# Patient Record
Sex: MALE | Race: WHITE | HISPANIC OR LATINO | Employment: FULL TIME | URBAN - METROPOLITAN AREA
[De-identification: names, ages, dates, MRNs, and addresses within clinical notes are randomized per-mention and may not be internally consistent; named-entity substitution may affect disease eponyms.]

---

## 2018-10-02 ENCOUNTER — NON-PROVIDER VISIT (OUTPATIENT)
Dept: URGENT CARE | Facility: CLINIC | Age: 43
End: 2018-10-02

## 2018-10-02 DIAGNOSIS — Z02.1 PRE-EMPLOYMENT DRUG SCREENING: ICD-10-CM

## 2018-10-02 LAB
AMP AMPHETAMINE: NEGATIVE
COC COCAINE: NEGATIVE
INT CON NEG: NORMAL
INT CON POS: NORMAL
MET METHAMPHETAMINES: NEGATIVE
OPI OPIATES: NEGATIVE
PCP PHENCYCLIDINE: NEGATIVE
POC DRUG COMMENT 753798-OCCUPATIONAL HEALTH: NORMAL
THC: NEGATIVE

## 2018-10-02 PROCEDURE — 80305 DRUG TEST PRSMV DIR OPT OBS: CPT | Performed by: PHYSICIAN ASSISTANT

## 2020-02-26 ENCOUNTER — TELEPHONE (OUTPATIENT)
Dept: SCHEDULING | Facility: IMAGING CENTER | Age: 45
End: 2020-02-26

## 2020-03-26 ENCOUNTER — OFFICE VISIT (OUTPATIENT)
Dept: MEDICAL GROUP | Facility: PHYSICIAN GROUP | Age: 45
End: 2020-03-26
Payer: COMMERCIAL

## 2020-03-26 VITALS
HEIGHT: 64 IN | TEMPERATURE: 98.1 F | WEIGHT: 291.4 LBS | OXYGEN SATURATION: 80 % | DIASTOLIC BLOOD PRESSURE: 88 MMHG | HEART RATE: 104 BPM | BODY MASS INDEX: 49.75 KG/M2 | SYSTOLIC BLOOD PRESSURE: 118 MMHG | RESPIRATION RATE: 20 BRPM

## 2020-03-26 DIAGNOSIS — G47.30 SLEEP APNEA, UNSPECIFIED TYPE: ICD-10-CM

## 2020-03-26 DIAGNOSIS — R73.03 PREDIABETES: ICD-10-CM

## 2020-03-26 DIAGNOSIS — Z11.3 SCREENING FOR STD (SEXUALLY TRANSMITTED DISEASE): ICD-10-CM

## 2020-03-26 DIAGNOSIS — Z30.09 VASECTOMY EVALUATION: ICD-10-CM

## 2020-03-26 DIAGNOSIS — Z13.29 SCREENING FOR THYROID DISORDER: ICD-10-CM

## 2020-03-26 DIAGNOSIS — Z23 NEED FOR VACCINATION: ICD-10-CM

## 2020-03-26 DIAGNOSIS — R09.02 HYPOXIA: ICD-10-CM

## 2020-03-26 DIAGNOSIS — E66.01 MORBID OBESITY WITH BMI OF 50.0-59.9, ADULT (HCC): ICD-10-CM

## 2020-03-26 PROCEDURE — 90686 IIV4 VACC NO PRSV 0.5 ML IM: CPT | Performed by: PHYSICIAN ASSISTANT

## 2020-03-26 PROCEDURE — 90472 IMMUNIZATION ADMIN EACH ADD: CPT | Performed by: PHYSICIAN ASSISTANT

## 2020-03-26 PROCEDURE — 90471 IMMUNIZATION ADMIN: CPT | Performed by: PHYSICIAN ASSISTANT

## 2020-03-26 PROCEDURE — 99204 OFFICE O/P NEW MOD 45 MIN: CPT | Mod: 25 | Performed by: PHYSICIAN ASSISTANT

## 2020-03-26 PROCEDURE — 90715 TDAP VACCINE 7 YRS/> IM: CPT | Performed by: PHYSICIAN ASSISTANT

## 2020-03-26 RX ORDER — IBUPROFEN 200 MG
200 TABLET ORAL EVERY 6 HOURS PRN
COMMUNITY
End: 2020-04-23

## 2020-03-26 ASSESSMENT — PATIENT HEALTH QUESTIONNAIRE - PHQ9: CLINICAL INTERPRETATION OF PHQ2 SCORE: 0

## 2020-03-26 NOTE — PROGRESS NOTES
Chief Complaint   Patient presents with   • Establish Care       HISTORY OF THE PRESENT ILLNESS: Coco Mcdonnell is a 45 y.o. male new patient to our practice. This pleasant patient is here today to establish care and to discuss the evaluation and management of:    Hypoxia  Sleep apnea, unspecified type  During today's appointment patient's pulse ox on room air was fluctuating between from 80-88.  After patient had walked for 2 minutes pulse ox dropped to 80 on room air.    He denied feeling shortness of breath or dizzy.  States he feels well.  He does mention that he was diagnosed with sleep apnea 2+ years ago while living in [facility].  States he was prescribed a CPAP machine and used it compliantly up until 1 year ago.  States 1 year ago he relocated to [place] and had to return CPAP machine.  States he is not been using a CPAP machine for a year.  He admits to witnessed apneic episodes and states he snores.  Admits to daytime somnolence.  Patient's BMI is 50.02.    Prediabetes  Patient states he is a positive past medical history for prediabetes.  [facility] lab work has not been completed in several years.  He tells me his diet could improve and he does not have a regular exercise routine.  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  He denies polyuria, polydipsia, poor wound healing, vision changes, peripheral neuropathy.    Morbid obesity with BMI of 50.0-59.9, adult (HCC)  See above.    Screening for STD (sexually transmitted disease)  Patient is requesting to be screened for sexually transmitted infections.  States he is asymptomatic.  Denies fever, chills, nausea, vomiting, joint pain, headache, unintentional weight loss, myalgias, night sweats, lymphadenopathy, abnormal urethral discharge, dysuria, hematuria, urgency or frequency.  Denies genitalia blister/warts/lesions.    Vasectomy evaluation  Patient is requesting to be referred to urologist to discuss  vasectomy.'s states he and his wife have discussed this in great detail and have agreed for him to proceed with a vasectomy.  Referral has been placed.      No past medical history on file.    Past Surgical History:   Procedure Laterality Date   • APPENDECTOMY         Family Status   Relation Name Status   • Mo  Alive   • Fa     • Bro  Alive   • MGMo     • MGFa     • PGMo     • PGFa     • Son Twin Alive   • Son Twin Alive   • Son  Alive     Family History   Problem Relation Age of Onset   • No Known Problems Mother    • Heart Attack Father 52        Passed away at 51 y/o   • Hypertension Father    • Hyperlipidemia Father    • Obesity Father    • No Known Problems Brother    • No Known Problems Son    • No Known Problems Son    • No Known Problems Son        Social History     Tobacco Use   • Smoking status: Light Tobacco Smoker     Years: 10.00     Types: Cigarettes   • Smokeless tobacco: Never Used   • Tobacco comment: Smokes once a month when with friends and loved ones.   Substance Use Topics   • Alcohol use: Yes     Comment: Socially.    • Drug use: Yes     Types: Marijuana     Comment: 2 times per week.        Allergies: Patient has no known allergies.    Current Outpatient Medications Ordered in Epic   Medication Sig Dispense Refill   • ibuprofen (MOTRIN) 200 MG Tab Take 200 mg by mouth every 6 hours as needed.       No current Epic-ordered facility-administered medications on file.        Review of Systems   Constitutional: Negative for fever, chills, weight loss and malaise/fatigue.   HENT: Negative for ear pain, nosebleeds, congestion, sore throat and neck pain.    Eyes: Negative for blurred vision.   Respiratory: Negative for cough, sputum production, shortness of breath and wheezing.    Cardiovascular: Negative for chest pain, palpitations, orthopnea and leg swelling.   Gastrointestinal: Negative for heartburn, nausea, vomiting and abdominal pain.  "  Genitourinary: Negative for dysuria, urgency and frequency.   Musculoskeletal: Negative for myalgias, back pain and joint pain.   Skin: Negative for rash and itching.   Neurological: Negative for dizziness, tingling, tremors, sensory change, focal weakness and headaches.   Endo/Heme/Allergies: Does not bruise/bleed easily.   Psychiatric/Behavioral: Negative for depression, anxiety, or memory loss.     All other systems reviewed and are negative except as in HPI.    Exam: /88 (BP Location: Left arm, Patient Position: Sitting)   Pulse (!) 104   Temp 36.7 °C (98.1 °F) (Temporal)   Resp 20   Ht 1.626 m (5' 4\")   Wt (!) 132.2 kg (291 lb 6.4 oz)   SpO2 (!) 80%  Body mass index is 50.02 kg/m².  General: Normal appearing. No distress.  HEENT: Normocephalic. Eyes conjunctiva clear lids without ptosis, ears normal shape and contour.  Neck: Supple without JVD. Thyroid is not enlarged.  Pulmonary: Clear to ausculation.  Normal effort. No rales, ronchi, or wheezing.  Cardiovascular: Regular rate and rhythm without murmur.   Abdomen:  Positive for central obesity.  Neurologic: Grossly nonfocal  Skin: Warm and dry.  No obvious lesions. + for acanthosis nigricans of posterior neck.  Musculoskeletal: Normal gait. No extremity cyanosis, clubbing, or edema.  Psych: Normal mood and affect. Alert and oriented x3. Judgment and insight is normal.    Medical decision-making and discussion:  1. Hypoxia  2. Sleep apnea, unspecified type    During today's appointment patient's pulse ox on room air was fluctuating between from 80-88.  After patient had walked for 2 minutes pulse ox dropped to 80 on room air.    He denied feeling shortness of breath or dizzy.  States he feels well.  He does mention that he was diagnosed with sleep apnea 2+ years ago while living in [facility].  States he was prescribed a CPAP machine and used it compliantly up until 1 year ago.  States 1 year ago he relocated to [place] and had to return CPAP " machine.  States he is not been using a CPAP machine for a year.  He admits to witnessed apneic episodes and states he snores.  Admits to daytime somnolence.  Patient's BMI is 50.02.    Patient has been urgently referred to pulmonology and sleep studies.  Pulse ox has also been ordered and 24 7 supplemental oxygen 2 L/min has been ordered as well.  Patient will be contacted with instructions.    Highly emphasized importance of normalizing body weight.  Patient has been referred to medical weight management as well.    - REFERRAL TO PULMONOLOGY  - REFERRAL TO SLEEP STUDIES  - DME Pulse Oximetry    3. Prediabetes  Highly emphasized importance of healthy diet regular exercise routine with patient.  Patient has been approved to medical weight management.  Patient be contacted with lab work results.  Patient will follow-up in 1 month to discuss lab work results.    - Comp Metabolic Panel; Future  - CBC WITH DIFFERENTIAL; Future  - Lipid Profile; Future  - HEMOGLOBIN A1C; Future  - MICROALB/CREAT RATIO RAND. UR  - REFERRAL TO [facility] (HIP) Services Requested: Physician Medical Weight Management Program, Registered Dietitian for Medical Nutrition Therapy, General-HIP Staff to Evaluate Best Program; Reason for Referral? Waist Circum...    4. Morbid obesity with BMI of 50.0-59.9, adult (HCC)  - Encouraged diet high in fruits, vegetables, and fiber. And a diet low in salt, refined carbohydrates, cholesterol, saturated fat, and trans fatty acids.    - Encouraged  a minimum of 30 minutes of moderate intensity aerobic exercise (eg, brisk walking) is recommended on five days each week. Or 20 minutes of vigorous-intensity aerobic exercise (eg, jogging) on three days each week.     Patient has been furred to medical weight management.  Patient agreed to plan.  Lab work has been ordered.  Patient be contacted with lab work results.    - Patient identified as having weight management issue.  Appropriate  orders and counseling given.  - Comp Metabolic Panel; Future  - Lipid Profile; Future  - TSH WITH REFLEX TO FT4; Future  - HEMOGLOBIN A1C; Future  - REFERRAL TO [facility] (HIP) Services Requested: Physician Medical Weight Management Program, Registered Dietitian for Medical Nutrition Therapy, General-[facility] Staff to Evaluate Best Program; Reason for Referral? Waist Circum...    5. Screening for thyroid disorder    - TSH WITH REFLEX TO FT4; Future    6. Screening for STD (sexually transmitted disease)  Lab work has been ordered to further evaluate patient.  Patient be contacted with results.  Depending on results a follow-up appointment may be warranted.    - Chlamydia/GC PCR Urine Or Swab; Future  - HIV AG/AB COMBO ASSAY SCREENING; Future  - RPR (SYPHILIS); Future  - HEP B SURFACE ANTIGEN; Future  - HEP C VIRUS ANTIBODY; Future    7. Vasectomy evaluation  Patient is requesting to be referred to urologist to discuss vasectomy.'s states he and his wife have discussed this in great detail and have agreed for him to proceed with a vasectomy.  Referral has been placed.    - REFERRAL TO UROLOGY    8. Need for vaccination  Vaccinations were administered to patient without complications.  Patient was provided VIS forms.    - Tdap Vaccine =>6YO IM  - Influenza Vaccine Quad Injection (PF)      Please note that this dictation was created using voice recognition software. I have made every reasonable attempt to correct obvious errors, but I expect that there are errors of grammar and possibly content that I did not discover before finalizing the note.      Assessment/Plan  1. Hypoxia  REFERRAL TO PULMONOLOGY    REFERRAL TO SLEEP STUDIES   2. Sleep apnea, unspecified type  REFERRAL TO SLEEP STUDIES    CANCELED: REFERRAL TO SLEEP STUDIES   3. Prediabetes  Comp Metabolic Panel    CBC WITH DIFFERENTIAL    Lipid Profile    HEMOGLOBIN A1C    MICROALB/CREAT RATIO RAND. UR    REFERRAL TO [facility]  PROGRAMS (HIP) Services Requested: Physician Medical Weight Management Program, Registered Dietitian for Medical Nutrition Therapy[facility]; Reason for Referral? Waist Circum...   4. Morbid obesity with BMI of 50.0-59.9, adult (HCC)  Patient identified as having weight management issue.  Appropriate orders and counseling given.    Comp Metabolic Panel    Lipid Profile    TSH WITH REFLEX TO FT4    HEMOGLOBIN A1C    REFERRAL TO [facility] (HIP) Services Requested: Physician Medical Weight Management Program, Registered Dietitian for Medical Nutrition Therapy, [facility] to [facility] Best [facility]; Reason for Referral? Waist Circum...   5. Screening for thyroid disorder  TSH WITH REFLEX TO FT4   6. Screening for STD (sexually transmitted disease)  Chlamydia/GC PCR Urine Or Swab    HIV AG/AB COMBO ASSAY SCREENING    RPR (SYPHILIS)    HEP B SURFACE ANTIGEN    HEP C VIRUS ANTIBODY   7. Vasectomy evaluation  REFERRAL TO UROLOGY   8. Need for vaccination  Tdap Vaccine =>6YO IM    Influenza Vaccine Quad Injection (PF)       Return in about 1 month (around 4/26/2020).

## 2020-03-27 ENCOUNTER — TELEPHONE (OUTPATIENT)
Dept: MEDICAL GROUP | Facility: PHYSICIAN GROUP | Age: 45
End: 2020-03-27

## 2020-03-27 NOTE — TELEPHONE ENCOUNTER
Phone Number Called: [phone] (home)     Call outcome: Spoke to patient regarding message below.    Message: Pt informed,voiced understanding. Orders sent to [facility].  ----- Message from Dominga Villaseñor P.A.-C. sent at 3/26/2020 10:40 AM PDT -----  Demetrius Horn,    Please place portable oxygen concentrator 2 LPM.    Please call patient and advise him that he needs to use the portable oxygen 24/7.  Advised him when he is doing the pulse oximetry overnight test to not use the portable oxygen at that time because the need to see what his oxygen levels are at night without using the supplemental oxygen.  Advised patient have urgent referred him to sleep studies and pulmonology for further evaluation.      Thank you,    Cherelle GRANT

## 2020-03-28 ENCOUNTER — HOSPITAL ENCOUNTER (OUTPATIENT)
Dept: LAB | Facility: MEDICAL CENTER | Age: 45
End: 2020-03-28
Attending: PHYSICIAN ASSISTANT
Payer: COMMERCIAL

## 2020-03-28 DIAGNOSIS — R73.03 PREDIABETES: ICD-10-CM

## 2020-03-28 DIAGNOSIS — E66.01 MORBID OBESITY WITH BMI OF 50.0-59.9, ADULT (HCC): ICD-10-CM

## 2020-03-28 DIAGNOSIS — Z11.3 SCREENING FOR STD (SEXUALLY TRANSMITTED DISEASE): ICD-10-CM

## 2020-03-28 DIAGNOSIS — Z13.29 SCREENING FOR THYROID DISORDER: ICD-10-CM

## 2020-03-28 LAB
ALBUMIN SERPL BCP-MCNC: 4.2 G/DL (ref 3.2–4.9)
ALBUMIN/GLOB SERPL: 1.4 G/DL
ALP SERPL-CCNC: 48 U/L (ref 30–99)
ALT SERPL-CCNC: 23 U/L (ref 2–50)
ANION GAP SERPL CALC-SCNC: 9 MMOL/L (ref 7–16)
AST SERPL-CCNC: 14 U/L (ref 12–45)
BASOPHILS # BLD AUTO: 0.5 % (ref 0–1.8)
BASOPHILS # BLD: 0.04 K/UL (ref 0–0.12)
BILIRUB SERPL-MCNC: 0.6 MG/DL (ref 0.1–1.5)
BUN SERPL-MCNC: 10 MG/DL (ref 8–22)
C TRACH DNA SPEC QL NAA+PROBE: NEGATIVE
CALCIUM SERPL-MCNC: 9.2 MG/DL (ref 8.5–10.5)
CHLORIDE SERPL-SCNC: 104 MMOL/L (ref 96–112)
CHOLEST SERPL-MCNC: 162 MG/DL (ref 100–199)
CO2 SERPL-SCNC: 31 MMOL/L (ref 20–33)
CREAT SERPL-MCNC: 0.86 MG/DL (ref 0.5–1.4)
CREAT UR-MCNC: 102.76 MG/DL
EOSINOPHIL # BLD AUTO: 0.14 K/UL (ref 0–0.51)
EOSINOPHIL NFR BLD: 1.9 % (ref 0–6.9)
ERYTHROCYTE [DISTWIDTH] IN BLOOD BY AUTOMATED COUNT: 60.2 FL (ref 35.9–50)
EST. AVERAGE GLUCOSE BLD GHB EST-MCNC: 148 MG/DL
GLOBULIN SER CALC-MCNC: 2.9 G/DL (ref 1.9–3.5)
GLUCOSE SERPL-MCNC: 98 MG/DL (ref 65–99)
HBA1C MFR BLD: 6.8 % (ref 0–5.6)
HBV SURFACE AG SER QL: NORMAL
HCT VFR BLD AUTO: 64 % (ref 42–52)
HCV AB SER QL: NORMAL
HDLC SERPL-MCNC: 40 MG/DL
HGB BLD-MCNC: 20.3 G/DL (ref 14–18)
HIV 1+2 AB+HIV1 P24 AG SERPL QL IA: NORMAL
IMM GRANULOCYTES # BLD AUTO: 0.03 K/UL (ref 0–0.11)
IMM GRANULOCYTES NFR BLD AUTO: 0.4 % (ref 0–0.9)
LDLC SERPL CALC-MCNC: 107 MG/DL
LYMPHOCYTES # BLD AUTO: 1.84 K/UL (ref 1–4.8)
LYMPHOCYTES NFR BLD: 24.8 % (ref 22–41)
MCH RBC QN AUTO: 31.8 PG (ref 27–33)
MCHC RBC AUTO-ENTMCNC: 31.7 G/DL (ref 33.7–35.3)
MCV RBC AUTO: 100.2 FL (ref 81.4–97.8)
MICROALBUMIN UR-MCNC: 12 MG/DL
MICROALBUMIN/CREAT UR: 117 MG/G (ref 0–30)
MONOCYTES # BLD AUTO: 0.76 K/UL (ref 0–0.85)
MONOCYTES NFR BLD AUTO: 10.3 % (ref 0–13.4)
N GONORRHOEA DNA SPEC QL NAA+PROBE: NEGATIVE
NEUTROPHILS # BLD AUTO: 4.6 K/UL (ref 1.82–7.42)
NEUTROPHILS NFR BLD: 62.1 % (ref 44–72)
NRBC # BLD AUTO: 0 K/UL
NRBC BLD-RTO: 0 /100 WBC
PLATELET # BLD AUTO: 198 K/UL (ref 164–446)
PMV BLD AUTO: 10.5 FL (ref 9–12.9)
POTASSIUM SERPL-SCNC: 4.5 MMOL/L (ref 3.6–5.5)
PROT SERPL-MCNC: 7.1 G/DL (ref 6–8.2)
RBC # BLD AUTO: 6.39 M/UL (ref 4.7–6.1)
SODIUM SERPL-SCNC: 144 MMOL/L (ref 135–145)
SPECIMEN SOURCE: NORMAL
TREPONEMA PALLIDUM IGG+IGM AB [PRESENCE] IN SERUM OR PLASMA BY IMMUNOASSAY: NORMAL
TRIGL SERPL-MCNC: 76 MG/DL (ref 0–149)
TSH SERPL DL<=0.005 MIU/L-ACNC: 1.86 UIU/ML (ref 0.38–5.33)
WBC # BLD AUTO: 7.4 K/UL (ref 4.8–10.8)

## 2020-03-28 PROCEDURE — 82570 ASSAY OF URINE CREATININE: CPT

## 2020-03-28 PROCEDURE — 84443 ASSAY THYROID STIM HORMONE: CPT

## 2020-03-28 PROCEDURE — 87340 HEPATITIS B SURFACE AG IA: CPT

## 2020-03-28 PROCEDURE — 80061 LIPID PANEL: CPT

## 2020-03-28 PROCEDURE — 86780 TREPONEMA PALLIDUM: CPT

## 2020-03-28 PROCEDURE — 80053 COMPREHEN METABOLIC PANEL: CPT

## 2020-03-28 PROCEDURE — 83036 HEMOGLOBIN GLYCOSYLATED A1C: CPT

## 2020-03-28 PROCEDURE — 87491 CHLMYD TRACH DNA AMP PROBE: CPT

## 2020-03-28 PROCEDURE — 85025 COMPLETE CBC W/AUTO DIFF WBC: CPT

## 2020-03-28 PROCEDURE — 87389 HIV-1 AG W/HIV-1&-2 AB AG IA: CPT

## 2020-03-28 PROCEDURE — 36415 COLL VENOUS BLD VENIPUNCTURE: CPT

## 2020-03-28 PROCEDURE — 87591 N.GONORRHOEAE DNA AMP PROB: CPT

## 2020-03-28 PROCEDURE — 86803 HEPATITIS C AB TEST: CPT

## 2020-03-28 PROCEDURE — 82043 UR ALBUMIN QUANTITATIVE: CPT

## 2020-03-31 ENCOUNTER — TELEPHONE (OUTPATIENT)
Dept: MEDICAL GROUP | Facility: PHYSICIAN GROUP | Age: 45
End: 2020-03-31

## 2020-03-31 DIAGNOSIS — D64.9 ANEMIA, UNSPECIFIED TYPE: ICD-10-CM

## 2020-03-31 NOTE — TELEPHONE ENCOUNTER
Phone Number Called: [phone] (home)       Call outcome: Spoke to patient regarding message below.    Message: Pt informed, Pt understood, no questions at this time.

## 2020-03-31 NOTE — TELEPHONE ENCOUNTER
----- Message from Dominga Villaseñor P.A.-C. sent at 3/31/2020  9:09 AM PDT -----  Demetrius Sepulveda,    I hope this finds you well.  I have reviewed your lab work results and results indicate that you have diabetes with some kidney damage due to diabetes.  We will discuss in more detail during your follow-up appointment.  Bad cholesterol is slightly elevated.  We will also discuss this during your follow-up appointment.  Please continue working on diet and exercise.  Lab work indicates possible anemia.  Additional lab work has been ordered for you to complete.  You do not need to fast to complete lab work.  Lab work indicates your negative for hep C, HIV, hep B, syphilis, chlamydia and gonorrhea.    Thank you,    Cherelle GRANT

## 2020-04-08 ENCOUNTER — HOSPITAL ENCOUNTER (OUTPATIENT)
Dept: LAB | Facility: MEDICAL CENTER | Age: 45
End: 2020-04-08
Attending: PHYSICIAN ASSISTANT
Payer: COMMERCIAL

## 2020-04-08 DIAGNOSIS — D64.9 ANEMIA, UNSPECIFIED TYPE: ICD-10-CM

## 2020-04-08 LAB
FERRITIN SERPL-MCNC: 131 NG/ML (ref 22–322)
FOLATE SERPL-MCNC: 19.8 NG/ML
IRON SATN MFR SERPL: 28 % (ref 15–55)
IRON SERPL-MCNC: 112 UG/DL (ref 50–180)
TIBC SERPL-MCNC: 402 UG/DL (ref 250–450)
UIBC SERPL-MCNC: 290 UG/DL (ref 110–370)
VIT B12 SERPL-MCNC: 406 PG/ML (ref 211–911)

## 2020-04-08 PROCEDURE — 82746 ASSAY OF FOLIC ACID SERUM: CPT

## 2020-04-08 PROCEDURE — 83540 ASSAY OF IRON: CPT

## 2020-04-08 PROCEDURE — 82728 ASSAY OF FERRITIN: CPT

## 2020-04-08 PROCEDURE — 83550 IRON BINDING TEST: CPT

## 2020-04-08 PROCEDURE — 36415 COLL VENOUS BLD VENIPUNCTURE: CPT

## 2020-04-08 PROCEDURE — 82607 VITAMIN B-12: CPT

## 2020-04-13 ASSESSMENT — ENCOUNTER SYMPTOMS
HEMOPTYSIS: 1
WHEEZING: 1
CHEST TIGHTNESS: 1
DYSPNEA AT REST: 0

## 2020-04-14 ENCOUNTER — OFFICE VISIT (OUTPATIENT)
Dept: PULMONOLOGY | Facility: HOSPICE | Age: 45
End: 2020-04-14
Payer: COMMERCIAL

## 2020-04-14 VITALS
SYSTOLIC BLOOD PRESSURE: 136 MMHG | HEART RATE: 88 BPM | WEIGHT: 295.38 LBS | OXYGEN SATURATION: 94 % | BODY MASS INDEX: 50.43 KG/M2 | HEIGHT: 64 IN | DIASTOLIC BLOOD PRESSURE: 96 MMHG

## 2020-04-14 DIAGNOSIS — G47.33 OSA (OBSTRUCTIVE SLEEP APNEA): ICD-10-CM

## 2020-04-14 DIAGNOSIS — D75.1 POLYCYTHEMIA: Primary | ICD-10-CM

## 2020-04-14 DIAGNOSIS — J96.11 CHRONIC RESPIRATORY FAILURE WITH HYPOXIA (HCC): ICD-10-CM

## 2020-04-14 PROCEDURE — 99214 OFFICE O/P EST MOD 30 MIN: CPT | Performed by: INTERNAL MEDICINE

## 2020-04-14 ASSESSMENT — ENCOUNTER SYMPTOMS
WHEEZING: 1
GASTROINTESTINAL NEGATIVE: 1
PSYCHIATRIC NEGATIVE: 1
NEUROLOGICAL NEGATIVE: 1
CONSTITUTIONAL NEGATIVE: 1
HEMOPTYSIS: 1
MUSCULOSKELETAL NEGATIVE: 1
EYES NEGATIVE: 1
CARDIOVASCULAR NEGATIVE: 1

## 2020-04-14 ASSESSMENT — FIBROSIS 4 INDEX: FIB4 SCORE: 0.66

## 2020-04-14 NOTE — ASSESSMENT & PLAN NOTE
_x___ Discussed the pathophysiology of sleep disordered breathing including risks for hypertension, heart attack, and stroke   Also discussed treatment options including CPAP, an oral appliance, and surgical intervention.   __x___ Discussed weight control program with goal of achieving and maintaining ideal body weight.   __x___ Polysomnography was discussed and ordered   Hst on 4 l/min   Not ideal and would have preferred inlab but due to pandemic labs are closed and pt does have dangerously elevated hct  __x___Discussed weight control programs with goal of achieving and maintaining ideal body weight   __x___Discussed the diagnosis , management and pathophysiology JASON   _x___Discussed the risks associated with driving and operating equipment while drowsy. The patient verbalized an understanding of this and agreed to take appropriate measures to eliminate these risks.   __x___Discussed the cardiovascular and neuropsychiatric risks of untreated JASON; including but not limited to: HTN, DM, MI, ASCVD, CVA, CHF, traffic accidents   __x___ Discussed positive airway pressure (PAP) as the preferred therapy for severe JASON;   _____ Discussed risks of anesthesia/analgesia associated with JASON and the need to use PAP in the pre, liz, and post -operative periods.

## 2020-04-14 NOTE — ASSESSMENT & PLAN NOTE
Due to hypoxemia likely due to untreated JASON and obesity hypoventilation syndrome  HCT 64  High risks of MI and stroke  Discussed with DR. Lyles from hematology - agrees with outpatient phlebotomy  Scheduled for three days and to stop if hct less than or = to 55  CBC check post each day  Will get sleep study at that time  Reviewed with patient in detail

## 2020-04-14 NOTE — PROGRESS NOTES
Pulmonary Consultation    Date of Service: 4/14/2020    Consulting Physician: Dominga Villaseñor P.A.-C.    Reason for consult:  Establish Care (Referred by Dominga Villaseñor PA-C for Low O2 sat) and Other (Labs 03/28/2020)      Problem List Items Addressed This Visit     Polycythemia - Primary     Due to hypoxemia likely due to untreated JASON and obesity hypoventilation syndrome  HCT 64  High risks of MI and stroke  Discussed with DR. Lyles from hematology - agrees with outpatient phlebotomy  Scheduled for three days and to stop if hct less than or = to 55  CBC check post each day  Will get sleep study at that time  Reviewed with patient in detail           Relevant Orders    CBC WITHOUT DIFFERENTIAL    CBC WITHOUT DIFFERENTIAL    CBC WITHOUT DIFFERENTIAL    JASON (obstructive sleep apnea)       _x___ Discussed the pathophysiology of sleep disordered breathing including risks for hypertension, heart attack, and stroke   Also discussed treatment options including CPAP, an oral appliance, and surgical intervention.   __x___ Discussed weight control program with goal of achieving and maintaining ideal body weight.   __x___ Polysomnography was discussed and ordered   Hst on 4 l/min   Not ideal and would have preferred inlab but due to pandemic labs are closed and pt does have dangerously elevated hct  __x___Discussed weight control programs with goal of achieving and maintaining ideal body weight   __x___Discussed the diagnosis , management and pathophysiology JASON   _x___Discussed the risks associated with driving and operating equipment while drowsy. The patient verbalized an understanding of this and agreed to take appropriate measures to eliminate these risks.   __x___Discussed the cardiovascular and neuropsychiatric risks of untreated JASON; including but not limited to: HTN, DM, MI, ASCVD, CVA, CHF, traffic accidents   __x___ Discussed positive airway pressure (PAP) as the preferred therapy for severe JASON;   _____ Discussed risks  of anesthesia/analgesia associated with JASON and the need to use PAP in the pre, liz, and post -operative periods.         Relevant Orders    Overnight Home Sleep Study    Chronic respiratory failure with hypoxia (HCC)     Increased 4 l/min with ambulation and told pt to also increase night time to maintain sats > 88%             Follow up in one month    History of Present Illness: Coco Mcdonnell is a 45 y.o. male with a past medical history of hypoxemia and  SOB and Hct is 64!  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  SOL: immediate  denies racing thoughts/anxiety  use of hypnotic: no  WASO:  q 2 hrs  Ability to return to sleep:  yes  WT: 7 am  awakens refreshed: no  Excessive daytime somnolence:   Narcolepsy symptoms: no  RLS: no  Leg kicking:no  Parasomnia:no  sleep walking: no        Exercise tolerance:  Walking distance:SOB with stairs but flat ground ok  [place]: as above    Night time symptoms: as above    Pulmonary History:    Environmental exposures: no hot tub; no woodstove, no mining work, and no asbestos exposure     Pets:dog  Occupation History:warehous and production = [employer]  Family history of pulmonary disease: Grandmother - restrictive cesar disease  Second hand smoke exposure: no    Review of Systems   Constitutional: Negative.    HENT: Negative.    Eyes: Negative.    Respiratory: Positive for hemoptysis and wheezing.    Cardiovascular: Negative.    Gastrointestinal: Negative.    Genitourinary: Negative.    Musculoskeletal: Negative.    Skin: Negative.    Neurological: Negative.    Endo/Heme/Allergies: Negative.    Psychiatric/Behavioral: Negative.    Answers for HPI/ROS submitted by the patient on 4/13/2020   What  is the reason for your visit today?: check up  Do you cough first thing in the morning or at other times during the day?: morning  Do you have a cough on most days? If so, how long have you had this cough?: not on the day  Bring up phlegm (mucus, sputum) in the morning or other times during the day?: morning  If so, how many times, and approximately how much?: not often  How often?: frequently  Do you experience any chest tightness?: Yes  How often?: constant  Experience shortness of breath at rest?: No  How far can you walk before becoming short of breath or need to rest? : don't know  Please list what causes you to become short of breath:: walking up the stairs  Have you ever been hospitalized?: Yes  Reason, year, and hospital in which you were hospitalized:: appendicitis surgery as a child, [facility]  Have you ever needed to be intubated or placed on a ventilator? : No  Have you ever had problems with anesthesia?: No  Have you experienced post-operative delirium?: No  Any complications with surgery?: No  What year did you receive your last Flu shot?: 2020  What year did you receive you last Pneumonia shot?: no  Have you had a TB skin test? If so, please list the year and result:: i don't think i had  Have you had Allergy skin testing? If so, please list the year and result:: no  Please list all your occupations from your first job to your current job. Include Industry/Company, location, year, and your specific job.: [place] industry,[facility] 1997,present at [employer]  a bright Job: warehouse industry  a Mine or Quarry: no  a Foundry: no  with Pottery: no  Cot[employer] Mill: no  Flax Mill: no  Hemp Mill: no  Brick Plant: no  with Asbestos: no  as a Sandblaster: no  manufacturing of glass, ceramics, or abrasives: no  Acids: no  Arsenics: no  Cadmium: no  Chromium: no  Fibrogenic Dust: no  Lead: no  Nickel: no  Plastic: no  Solvents: no  TDI: no  Uranium: no  Please list the  "places you have lived, the year, and Country or State in the [place]:: [place] [id number]/[place] [id number]/[place] 2018to present  Birds?: No  Dogs?: Yes  Cats?: No  Mice and/or Deer Mice?: No  Reptiles?: No  Blood Chemistries: yes march 2020  Blood Count: yes march 2020  Cardiac Ultrasound: no  Chest X-ray: no  Pulmonary Function Test: no  CT Scan, Sinus: no  Electrocardiogram: no  Sleep Study: no  Coffee: 1 cup  Decaffeinated coffee: no  Energy drinks: no  Tea: no  Carbonated soft drinks: no         Current Outpatient Medications on File Prior to Visit   Medication Sig Dispense Refill   • ibuprofen (MOTRIN) 200 MG Tab Take 200 mg by mouth every 6 hours as needed.       No current facility-administered medications on file prior to visit.        Social History     Tobacco Use   • Smoking status: Light Tobacco Smoker     Years: 10.00     Types: Cigarettes   • Smokeless tobacco: Never Used   • Tobacco comment: Smokes once a month when with friends and loved ones.   Substance Use Topics   • Alcohol use: Not Currently     Comment: Socially.    • Drug use: Yes     Frequency: 4.0 times per week     Types: Marijuana     Comment: 2 times per week.         Past Medical History:   Diagnosis Date   • Chickenpox    • Diabetes (HCC)    • Kidney stone    • Obesity        Past Surgical History:   Procedure Laterality Date   • APPENDECTOMY  1981       Allergies: Patient has no known allergies.    Family History   Problem Relation Age of Onset   • No Known Problems Mother    • Heart Attack Father 52        Passed away at 53 y/o   • Hypertension Father    • Hyperlipidemia Father    • Obesity Father    • No Known Problems Brother    • No Known Problems Son    • No Known Problems Son    • No Known Problems Son        Vitals:    04/14/20 0821 04/14/20 0841   Height: 1.626 m (5' 4\")    Weight: (!) 134 kg (295 lb 6 oz)    Weight % change since last entry.: 0 %    BP: 136/96    Pulse: 88    BMI (Calculated): 50.7    O2 sat % room air:  " (!) 87 %   O2 sat % on O2:  94 %   O2 Flow Rate (L/min):  4       Physical Examination  Physical Exam   Constitutional: He is oriented to person, place, and time and well-developed, well-nourished, and in no distress. No distress.   HENT:   Head: Normocephalic and atraumatic.   Right Ear: External ear normal.   Left Ear: External ear normal.   Nose: Nose normal.   Mouth/Throat: Oropharynx is clear and moist. No oropharyngeal exudate.   Crowded airway   Eyes: Pupils are equal, round, and reactive to light. Conjunctivae and EOM are normal.   Neck: Normal range of motion. Neck supple. No JVD present. No tracheal deviation present. No thyromegaly present.   Cardiovascular: Normal rate, regular rhythm, normal heart sounds and intact distal pulses. Exam reveals no gallop and no friction rub.   No murmur heard.  Pulmonary/Chest: Effort normal and breath sounds normal. No stridor. No respiratory distress. He has no wheezes. He has no rales. He exhibits no tenderness.   Abdominal: Soft. Bowel sounds are normal. He exhibits no distension and no mass.   Musculoskeletal: Normal range of motion.         General: No deformity or edema.   Lymphadenopathy:     He has no cervical adenopathy.   Neurological: He is alert and oriented to person, place, and time. No cranial nerve deficit. He exhibits normal muscle tone. Gait normal. Coordination normal. GCS score is 15.   Skin: No rash noted. He is not diaphoretic.   Discoloration of lower extremities   Psychiatric: Mood, memory, affect and judgment normal.            Junior June M.D., MD MPH [facility]  [facility]

## 2020-04-15 ENCOUNTER — OUTPATIENT INFUSION SERVICES (OUTPATIENT)
Dept: ONCOLOGY | Facility: MEDICAL CENTER | Age: 45
End: 2020-04-15
Attending: INTERNAL MEDICINE
Payer: COMMERCIAL

## 2020-04-15 VITALS
WEIGHT: 296.3 LBS | RESPIRATION RATE: 18 BRPM | HEIGHT: 64 IN | SYSTOLIC BLOOD PRESSURE: 127 MMHG | HEART RATE: 89 BPM | BODY MASS INDEX: 50.59 KG/M2 | OXYGEN SATURATION: 91 % | DIASTOLIC BLOOD PRESSURE: 67 MMHG | TEMPERATURE: 98.4 F

## 2020-04-15 DIAGNOSIS — D75.1 POLYCYTHEMIA: ICD-10-CM

## 2020-04-15 LAB
BASOPHILS # BLD AUTO: 0.6 % (ref 0–1.8)
BASOPHILS # BLD: 0.04 K/UL (ref 0–0.12)
EOSINOPHIL # BLD AUTO: 0.18 K/UL (ref 0–0.51)
EOSINOPHIL NFR BLD: 2.5 % (ref 0–6.9)
ERYTHROCYTE [DISTWIDTH] IN BLOOD BY AUTOMATED COUNT: 54.2 FL (ref 35.9–50)
HCT VFR BLD AUTO: 60.6 % (ref 42–52)
HGB BLD-MCNC: 20.3 G/DL (ref 14–18)
IMM GRANULOCYTES # BLD AUTO: 0.01 K/UL (ref 0–0.11)
IMM GRANULOCYTES NFR BLD AUTO: 0.1 % (ref 0–0.9)
LYMPHOCYTES # BLD AUTO: 1.94 K/UL (ref 1–4.8)
LYMPHOCYTES NFR BLD: 27.4 % (ref 22–41)
MCH RBC QN AUTO: 32.4 PG (ref 27–33)
MCHC RBC AUTO-ENTMCNC: 33.5 G/DL (ref 33.7–35.3)
MCV RBC AUTO: 96.7 FL (ref 81.4–97.8)
MONOCYTES # BLD AUTO: 0.67 K/UL (ref 0–0.85)
MONOCYTES NFR BLD AUTO: 9.5 % (ref 0–13.4)
NEUTROPHILS # BLD AUTO: 4.24 K/UL (ref 1.82–7.42)
NEUTROPHILS NFR BLD: 59.9 % (ref 44–72)
NRBC # BLD AUTO: 0 K/UL
NRBC BLD-RTO: 0 /100 WBC
PLATELET # BLD AUTO: 197 K/UL (ref 164–446)
PMV BLD AUTO: 10.6 FL (ref 9–12.9)
RBC # BLD AUTO: 6.27 M/UL (ref 4.7–6.1)
WBC # BLD AUTO: 7.1 K/UL (ref 4.8–10.8)

## 2020-04-15 PROCEDURE — 99195 PHLEBOTOMY: CPT

## 2020-04-15 PROCEDURE — 36415 COLL VENOUS BLD VENIPUNCTURE: CPT

## 2020-04-15 PROCEDURE — 85025 COMPLETE CBC W/AUTO DIFF WBC: CPT

## 2020-04-15 ASSESSMENT — FIBROSIS 4 INDEX: FIB4 SCORE: 0.66

## 2020-04-15 NOTE — PROGRESS NOTES
Pt arrives for therapeutic phlebotomy.  Labs drawn as ordered by MD.  Pt's Hgb = 20.3   , Hct = 60.6  %.  500 cc of whole blood removed.  Pt tolerated well.  Orthostatic vitals stable, see flowsheet.  Pt denies chest pain, shortness of breath, dizziness, or lightheadedness after treatment.  Pt DC'd home to self care in good condition and in NAD. Appointment confirm for next treatment.

## 2020-04-16 ENCOUNTER — OUTPATIENT INFUSION SERVICES (OUTPATIENT)
Dept: ONCOLOGY | Facility: MEDICAL CENTER | Age: 45
End: 2020-04-16
Attending: INTERNAL MEDICINE
Payer: COMMERCIAL

## 2020-04-16 VITALS
BODY MASS INDEX: 52.3 KG/M2 | OXYGEN SATURATION: 91 % | WEIGHT: 295.2 LBS | RESPIRATION RATE: 18 BRPM | SYSTOLIC BLOOD PRESSURE: 126 MMHG | HEART RATE: 88 BPM | HEIGHT: 63 IN | DIASTOLIC BLOOD PRESSURE: 82 MMHG | TEMPERATURE: 98.1 F

## 2020-04-16 DIAGNOSIS — D75.1 POLYCYTHEMIA: ICD-10-CM

## 2020-04-16 LAB
BASOPHILS # BLD AUTO: 0.4 % (ref 0–1.8)
BASOPHILS # BLD: 0.03 K/UL (ref 0–0.12)
EOSINOPHIL # BLD AUTO: 0.24 K/UL (ref 0–0.51)
EOSINOPHIL NFR BLD: 3 % (ref 0–6.9)
ERYTHROCYTE [DISTWIDTH] IN BLOOD BY AUTOMATED COUNT: 53.6 FL (ref 35.9–50)
HCT VFR BLD AUTO: 57.7 % (ref 42–52)
HGB BLD-MCNC: 19.4 G/DL (ref 14–18)
IMM GRANULOCYTES # BLD AUTO: 0.02 K/UL (ref 0–0.11)
IMM GRANULOCYTES NFR BLD AUTO: 0.2 % (ref 0–0.9)
LYMPHOCYTES # BLD AUTO: 2.35 K/UL (ref 1–4.8)
LYMPHOCYTES NFR BLD: 29 % (ref 22–41)
MCH RBC QN AUTO: 32.3 PG (ref 27–33)
MCHC RBC AUTO-ENTMCNC: 33.6 G/DL (ref 33.7–35.3)
MCV RBC AUTO: 96 FL (ref 81.4–97.8)
MONOCYTES # BLD AUTO: 0.74 K/UL (ref 0–0.85)
MONOCYTES NFR BLD AUTO: 9.1 % (ref 0–13.4)
NEUTROPHILS # BLD AUTO: 4.72 K/UL (ref 1.82–7.42)
NEUTROPHILS NFR BLD: 58.3 % (ref 44–72)
NRBC # BLD AUTO: 0 K/UL
NRBC BLD-RTO: 0 /100 WBC
PLATELET # BLD AUTO: 195 K/UL (ref 164–446)
PMV BLD AUTO: 10.6 FL (ref 9–12.9)
RBC # BLD AUTO: 6.01 M/UL (ref 4.7–6.1)
WBC # BLD AUTO: 8.1 K/UL (ref 4.8–10.8)

## 2020-04-16 PROCEDURE — 85025 COMPLETE CBC W/AUTO DIFF WBC: CPT

## 2020-04-16 PROCEDURE — 99195 PHLEBOTOMY: CPT

## 2020-04-16 PROCEDURE — 36415 COLL VENOUS BLD VENIPUNCTURE: CPT

## 2020-04-16 ASSESSMENT — FIBROSIS 4 INDEX: FIB4 SCORE: 0.67

## 2020-04-16 NOTE — PROGRESS NOTES
Pt presented to infusion for D2 TP. He did well overnight with no issues after yesterday's TP. PIV started, brisk blood return observed, CBC drawn. Pt met parameters for TP. 500cc whole blood phlebotomized without issue. Orthostatic BP's taken and WNL. Pt with no dizziness or lightheadedness. Pt rec'd call from pulmonary office while here, they saw his labs from today, instructed him to cancel his appt for tomorrow, appt cancelled. PIV flushed and removed, gauze drsg placed. Left on foot to self care.

## 2020-04-17 ENCOUNTER — APPOINTMENT (OUTPATIENT)
Dept: ONCOLOGY | Facility: MEDICAL CENTER | Age: 45
End: 2020-04-17
Attending: INTERNAL MEDICINE
Payer: COMMERCIAL

## 2020-04-21 ENCOUNTER — HOME STUDY (OUTPATIENT)
Dept: SLEEP MEDICINE | Facility: MEDICAL CENTER | Age: 45
End: 2020-04-21
Payer: COMMERCIAL

## 2020-04-21 DIAGNOSIS — G47.33 OSA (OBSTRUCTIVE SLEEP APNEA): ICD-10-CM

## 2020-04-21 PROCEDURE — 95806 SLEEP STUDY UNATT&RESP EFFT: CPT | Performed by: INTERNAL MEDICINE

## 2020-04-23 ENCOUNTER — TELEMEDICINE (OUTPATIENT)
Dept: MEDICAL GROUP | Facility: PHYSICIAN GROUP | Age: 45
End: 2020-04-23
Payer: COMMERCIAL

## 2020-04-23 DIAGNOSIS — R06.89 HYPOVENTILATION SYNDROME: ICD-10-CM

## 2020-04-23 DIAGNOSIS — G47.33 OSA (OBSTRUCTIVE SLEEP APNEA): ICD-10-CM

## 2020-04-23 DIAGNOSIS — E11.9 TYPE 2 DIABETES MELLITUS WITHOUT COMPLICATION, WITHOUT LONG-TERM CURRENT USE OF INSULIN (HCC): ICD-10-CM

## 2020-04-23 DIAGNOSIS — R80.9 MICROALBUMINURIA: ICD-10-CM

## 2020-04-23 PROCEDURE — 99214 OFFICE O/P EST MOD 30 MIN: CPT | Mod: 95,CR | Performed by: PHYSICIAN ASSISTANT

## 2020-04-23 RX ORDER — LISINOPRIL 2.5 MG/1
2.5 TABLET ORAL DAILY
Qty: 90 TAB | Refills: 2 | Status: SHIPPED | OUTPATIENT
Start: 2020-04-23 | End: 2020-12-21 | Stop reason: SDUPTHER

## 2020-04-23 ASSESSMENT — FIBROSIS 4 INDEX: FIB4 SCORE: 0.67

## 2020-04-28 ENCOUNTER — PATIENT MESSAGE (OUTPATIENT)
Dept: PULMONOLOGY | Facility: HOSPICE | Age: 45
End: 2020-04-28

## 2020-04-28 DIAGNOSIS — G47.33 OSA (OBSTRUCTIVE SLEEP APNEA): ICD-10-CM

## 2020-04-28 NOTE — PROCEDURES
The patient is a 45-year-old male with obesity hypoventilation syndrome.  Home polysomnography requested for evaluation of JASON.    A total recording time of 370 minutes was obtained with good-quality data noted.    There were 519 snore episodes noted associated with obstructive apneas.  The overall AHI was 85/h and associated with desaturations to a zoltan of 48%.  The patient spent 81% of the night below SPO2 of 90%.  Mean heart rate was 67 BPM.    Interpretation:  Severe JASON, YAN 85/h with desaturations to 48% SPO2.    Recommendations:  A designated in laboratory CPAP titration is advised for accurate calibration of CPAP pressures and hypoxia.

## 2020-04-28 NOTE — PATIENT COMMUNICATION
Orders signed to start APAP. Please notify patient with details for DME, f/u,etc. 2-3mos f/u for first compliance check    Patient understands they may have mask fits within first 30 days of therapy covered by insurance to obtain best fit.  Patient understands the need to use device every night for >4hrs to meet compliance standards for insurance purposes.

## 2020-04-29 NOTE — PATIENT COMMUNICATION
Order faxed to DME:  Tanna Campos ph [phone] / [place] [phone]  Pt notified and given all DME set up instructions via vm. Advised to call back with any questions.

## 2020-05-13 VITALS — BODY MASS INDEX: 49.68 KG/M2 | WEIGHT: 291 LBS | RESPIRATION RATE: 18 BRPM | HEIGHT: 64 IN

## 2020-05-13 NOTE — PROGRESS NOTES
Telemedicine Visit: Established Patient     This encounter was conducted via Zoom .   Verbal consent was obtained. Patient's identity was verified.    Subjective:   CC: Lab work, FMLA paperwork, sleep apnea.  Coco Mcdonnell is a 45 y.o. male presenting for evaluation and management of:    Discussed recent lab work results with patient.  Discussed with patient he has type 2 diabetes.  Discussed with patient diabetes is controlled.  Patient has agreed to start metformin and lisinopril.  Lab work results were positive for microalbuminemia.  Patient denies polyuria, polydipsia, poor wound healing, vision changes, neuropathy.    Patient is following up closely with pulmonology.  It appears patient has severe sleep apnea and since her last appointment had undergo phlebotomy due to uncontrolled sleep apnea.  Patient is currently using supplemental oxygen until advised otherwise.  He is requesting short-term disability paperwork be completed.  Discussed with patient paperwork will be completed.  We worked through paperwork together during today's appointment.    Patient tells me that he is feeling well.      ROS   Denies any recent fevers or chills. No nausea or vomiting. No chest pains or shortness of breath.     No Known Allergies    Current medicines (including changes today)  Current Outpatient Medications   Medication Sig Dispense Refill   • metFORMIN (GLUCOPHAGE) 500 MG Tab Take 1 Tab by mouth 2 times a day, with meals. 180 Tab 2   • lisinopril (PRINIVIL) 2.5 MG Tab Take 1 Tab by mouth every day. 90 Tab 2     No current facility-administered medications for this visit.        Patient Active Problem List    Diagnosis Date Noted   • Polycythemia 04/14/2020   • JASON (obstructive sleep apnea) 04/14/2020   • Chronic respiratory failure with hypoxia ([facility]) 04/14/2020   • Morbid obesity with BMI of 50.0-59.9, adult ([facility]) 03/26/2020       Family History   Problem Relation Age of Onset   • No Known Problems Mother    • Heart  "Attack Father 52        Passed away at 53 y/o   • Hypertension Father    • Hyperlipidemia Father    • Obesity Father    • No Known Problems Brother    • No Known Problems Son    • No Known Problems Son    • No Known Problems Son        He  has a past medical history of Chickenpox, Diabetes (HCC), Kidney stone, and Obesity.  He  has a past surgical history that includes appendectomy (1981).       Objective:   Vitals obtained by patient:   4/23/20 8:22 AM      Resp  18     Weight   132 kg (291 lb)     Height  1.626 m (5' 4\")          Physical Exam:  Constitutional: Alert, no distress, well-groomed.  Skin: No rashes in visible areas.  Eye: Round. Conjunctiva clear, lids normal. No icterus.   ENMT: Lips pink without lesions, good dentition, moist mucous membranes. Phonation normal.  Neck: No masses, no thyromegaly. Moves freely without pain.  CV: Pulse as reported by patient  Respiratory: Unlabored respiratory effort, no cough or audible wheeze  Psych: Alert and oriented x3, normal affect and mood.       Assessment and Plan:   The following treatment plan was discussed:     1. Type 2 diabetes mellitus without complication, without long-term current use of insulin ([facility])  Discussed recent lab work results with patient.  Positive for type 2 diabetes.  Diabetes is controlled.  Patient is agreed to start metformin.  Advised patient to take 1 tablet by mouth once daily for 2 weeks and then increase to 2 tablets by mouth once daily.  Advised patient take medication with food.  Discussed common side effects adverse reaction medication with patient.  Patient is also been prescribed 2.5 mg of lisinopril.  Discussed with patient most common side effect of lisinopril with a dry cough.  If he develops a dry cough to make a sooner follow-up appointment.  Patient agreed to plan.  Advised patient to work on diet exercise.  Continue to monitor closely.  Patient will follow-up in 3 months for evaluation.      - lisinopril (PRINIVIL) 2.5 " MG Tab; Take 1 Tab by mouth every day.  Dispense: 90 Tab; Refill: 2  - metFORMIN (GLUCOPHAGE) 500 MG Tab; Take 1 Tab by mouth 2 times a day, with meals.  Dispense: 180 Tab; Refill: 2    2. Microalbuminuria  Same as #1    - lisinopril (PRINIVIL) 2.5 MG Tab; Take 1 Tab by mouth every day.  Dispense: 90 Tab; Refill: 2  - metFORMIN (GLUCOPHAGE) 500 MG Tab; Take 1 Tab by mouth 2 times a day, with meals.  Dispense: 180 Tab; Refill: 2    3. Hypoventilation syndrome  4. JASON (obstructive sleep apnea)  Advised patient to continue following with pulmonology.  Advised him to contact his pulmonologist to discuss recent sleep study results.  Patient agreed to plan.  Continue supplemental oxygen until advised otherwise.  Short-term disability paperwork has been completed.  Will be faxed to requested facility.  Patient will follow-up in 3 months for evaluation.  Highly emphasized importance of normalizing body weight.  Continue work on diet and exercise.      Follow-up: Return in about 3 months (around 7/23/2020).

## 2020-06-18 ENCOUNTER — OFFICE VISIT (OUTPATIENT)
Dept: MEDICAL GROUP | Facility: PHYSICIAN GROUP | Age: 45
End: 2020-06-18
Payer: COMMERCIAL

## 2020-06-18 VITALS
BODY MASS INDEX: 47.35 KG/M2 | WEIGHT: 284.2 LBS | OXYGEN SATURATION: 90 % | HEIGHT: 65 IN | RESPIRATION RATE: 16 BRPM | SYSTOLIC BLOOD PRESSURE: 124 MMHG | DIASTOLIC BLOOD PRESSURE: 78 MMHG | TEMPERATURE: 98 F | HEART RATE: 90 BPM

## 2020-06-18 DIAGNOSIS — G47.33 OSA (OBSTRUCTIVE SLEEP APNEA): ICD-10-CM

## 2020-06-18 DIAGNOSIS — Z23 NEED FOR VACCINATION: ICD-10-CM

## 2020-06-18 DIAGNOSIS — R80.9 TYPE 2 DIABETES MELLITUS WITH MICROALBUMINURIA, WITHOUT LONG-TERM CURRENT USE OF INSULIN (HCC): ICD-10-CM

## 2020-06-18 DIAGNOSIS — E66.2 CLASS 3 OBESITY WITH ALVEOLAR HYPOVENTILATION, SERIOUS COMORBIDITY, AND BODY MASS INDEX (BMI) OF 45.0 TO 49.9 IN ADULT (HCC): ICD-10-CM

## 2020-06-18 DIAGNOSIS — E11.29 TYPE 2 DIABETES MELLITUS WITH MICROALBUMINURIA, WITHOUT LONG-TERM CURRENT USE OF INSULIN (HCC): ICD-10-CM

## 2020-06-18 PROCEDURE — 99214 OFFICE O/P EST MOD 30 MIN: CPT | Mod: 25 | Performed by: PHYSICIAN ASSISTANT

## 2020-06-18 PROCEDURE — 90732 PPSV23 VACC 2 YRS+ SUBQ/IM: CPT | Performed by: PHYSICIAN ASSISTANT

## 2020-06-18 PROCEDURE — 90471 IMMUNIZATION ADMIN: CPT | Performed by: PHYSICIAN ASSISTANT

## 2020-06-18 ASSESSMENT — FIBROSIS 4 INDEX: FIB4 SCORE: 0.67

## 2020-06-18 NOTE — PROGRESS NOTES
Chief Complaint   Patient presents with   • Paperwork     [employer]       HISTORY OF PRESENT ILLNESS: Coco Mcdonnell is an established 45 y.o. male here to discuss the evaluation and management of:    Patient is a pleasant 45-year-old male here today to follow-up on diabetes, obstructive sleep apnea, and hypoxia.  Patient is requesting [employer] paperwork be completed so he can return to work.    Patient was recently diagnosed with type 2 diabetes with microalbuminemia.  Hemoglobin A1c from 2820 was 6.8.  She was prescribed metformin 500 mg twice daily and lisinopril 2.5 mg tab once daily.  He has been taking medications compliantly.  Denies side effects or complications of medication.  States he has been working on his diet and exercising regular.  He tells me he has been walking 3 times a week 2-3 miles per time.  States since he has started exercising regularly he is feeling better overall.  He denies polyuria, polydipsia, poor wound healing, vision changes, peripheral neuropathy, dry cough.  He tells me his eye exam is up-to-date.      Patient was recently diagnosed with obstructive sleep apnea.  He tells me he has been using his CPAP compliantly.  He has no longer using supplemental oxygen during the day.  States since using CPAP fatigue and daytime somnolence has drastically improved.  He feels that he can return to work without restrictions.  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  States the glue aggravates respiratory symptoms.    He is following up with pulmonology regularly.      Patient Active Problem List    Diagnosis Date Noted   • Type 2 diabetes mellitus with microalbuminuria, without long-term current use of insulin ([facility]) 06/18/2020   • Polycythemia 04/14/2020   • JASON (obstructive sleep apnea) 04/14/2020   • Chronic respiratory failure with hypoxia ([facility]) 04/14/2020   • Class 3 obesity with alveolar hypoventilation, serious comorbidity, and body mass  index (BMI) of 45.0 to 49.9 in adult ([facility]) 2020       Allergies:Patient has no known allergies.    Current Outpatient Medications   Medication Sig Dispense Refill   • metFORMIN (GLUCOPHAGE) 500 MG Tab Take 1 Tab by mouth 2 times a day, with meals. 180 Tab 2   • lisinopril (PRINIVIL) 2.5 MG Tab Take 1 Tab by mouth every day. 90 Tab 2     No current facility-administered medications for this visit.        Social History     Tobacco Use   • Smoking status: Former Smoker     Years: 10.00     Types: Cigarettes   • Smokeless tobacco: Never Used   • Tobacco comment: Smokes once a month when with friends and loved ones.   Substance Use Topics   • Alcohol use: Not Currently     Comment: Socially.    • Drug use: Yes     Frequency: 4.0 times per week     Types: Marijuana     Comment: 2 times per week.        Family Status   Relation Name Status   • Mo  Alive   • Fa     • Bro  Alive   • MGMo     • MGFa     • PGMo     • PGFa     • Son Twin Alive   • Son Twin Alive   • Son  Alive     Family History   Problem Relation Age of Onset   • No Known Problems Mother    • Heart Attack Father 52        Passed away at 53 y/o   • Hypertension Father    • Hyperlipidemia Father    • Obesity Father    • No Known Problems Brother    • No Known Problems Son    • No Known Problems Son    • No Known Problems Son        ROS:  Review of Systems   Constitutional: Negative for fever, chills, weight loss and malaise/fatigue.   HENT: Negative for ear pain, nosebleeds, congestion, sore throat and neck pain.    Eyes: Negative for blurred vision.   Respiratory: Negative for cough, sputum production, shortness of breath and wheezing.    Cardiovascular: Negative for chest pain, palpitations, orthopnea and leg swelling.   Gastrointestinal: Negative for heartburn, nausea, vomiting and abdominal pain.   Genitourinary: Negative for dysuria, urgency and frequency.   Musculoskeletal: Negative for myalgias, back pain and  "joint pain.   Skin: Negative for rash and itching.   Neurological: Negative for dizziness, tingling, tremors, sensory change, focal weakness and headaches.   Endo/Heme/Allergies: Does not bruise/bleed easily.   Psychiatric/Behavioral: Negative for depression, suicidal ideas and memory loss.  The patient is not nervous/anxious and does not have insomnia.    All other systems reviewed and are negative except as in HPI.    Exam: /78 (BP Location: Right arm, Patient Position: Sitting, BP Cuff Size: Large adult)   Pulse 90   Temp 36.7 °C (98 °F) (Temporal)   Resp 16   Ht 1.651 m (5' 5\")   Wt (!) 128.9 kg (284 lb 3.2 oz)   SpO2 90%  Body mass index is 47.29 kg/m².  General: Normal appearing. No distress.  HEENT: Normocephalic. Eyes conjunctiva clear lids without ptosis, ears normal shape and contour.  Neck: Supple without JVD. Thyroid is not enlarged.  Pulmonary: Clear to ausculation.  Normal effort. No rales, ronchi, or wheezing.  Cardiovascular: Regular rate and rhythm without murmur.  Abdomen: Nondistended.   Neurologic: Grossly nonfocal.  Cranial nerves are normal.   Skin: Warm and dry.  No rashes or suspicious skin lesions.  Musculoskeletal: Normal gait. No extremity cyanosis, clubbing, or edema.  Psych: Normal mood and affect. Alert and oriented x3. Judgment and insight is normal.    Medical decision-making and discussion:  1. Type 2 diabetes mellitus with microalbuminuria, without long-term current use of insulin (HCC)  Diabetes currently controlled. Continue current medications. Patient also taking statin, ACE-I and ASA as instructed. Labwork as indicated. Eye exams up to date.  Diabetic foot exam is not up-to-date.  Will complete during a future and office appointment.  Patient was given a Pneumovax vaccination complication during today's appointment.  VIS form was provided to patient.    Patient will complete lab work at the end of June 2020.  A follow-up appointment will be made once lab work is " completed.    - HEMOGLOBIN A1C; Future  - MICROALB/CREAT RATIO RAND. UR  - Pneumococal Polysaccharide Vaccine 23-Valent =>1YO SQ/IM    2. JASON (obstructive sleep apnea)  Moderate but stable problem.  Continue CPAP machine as prescribed.  Continue following up with pulmonology.  Continue working on normalizing body weight.  Continue work on diet and exercise.    3. Class 3 obesity with alveolar hypoventilation, serious comorbidity, and body mass index (BMI) of 45.0 to 49.9 in adult ([facility])  Highly emphasized the importance of healthy diet regular exercise routine with patient.  Continue normalizing body weight.    4. Need for vaccination  Pneumovax was administered to patient without complication.  Patient was provided VIS form.  - Pneumococal Polysaccharide Vaccine 23-Valent =>1YO SQ/IM      Please note that this dictation was created using voice recognition software. I have made every reasonable attempt to correct obvious errors, but I expect that there are errors of grammar and possibly content that I did not discover before finalizing the note.    Assessment/Plan:  1. Type 2 diabetes mellitus with microalbuminuria, without long-term current use of insulin ([facility])  HEMOGLOBIN A1C    MICROALB/CREAT RATIO RAND. UR    Pneumococal Polysaccharide Vaccine 23-Valent =>1YO SQ/IM   2. JASON (obstructive sleep apnea)     3. Class 3 obesity with alveolar hypoventilation, serious comorbidity, and body mass index (BMI) of 45.0 to 49.9 in adult ([facility])     4. Need for vaccination  Pneumococal Polysaccharide Vaccine 23-Valent =>1YO SQ/IM       Return in about 1 month (around 7/18/2020), or if symptoms worsen or fail to improve.

## 2020-11-12 ENCOUNTER — APPOINTMENT (OUTPATIENT)
Dept: HEALTH INFORMATION MANAGEMENT | Facility: MEDICAL CENTER | Age: 45
End: 2020-11-12
Payer: COMMERCIAL

## 2020-12-02 ENCOUNTER — APPOINTMENT (OUTPATIENT)
Dept: HEALTH INFORMATION MANAGEMENT | Facility: MEDICAL CENTER | Age: 45
End: 2020-12-02
Payer: COMMERCIAL

## 2020-12-17 ENCOUNTER — APPOINTMENT (RX ONLY)
Dept: URBAN - METROPOLITAN AREA CLINIC 4 | Facility: CLINIC | Age: 45
Setting detail: DERMATOLOGY
End: 2020-12-17

## 2020-12-17 DIAGNOSIS — D485 NEOPLASM OF UNCERTAIN BEHAVIOR OF SKIN: ICD-10-CM

## 2020-12-17 PROBLEM — D48.5 NEOPLASM OF UNCERTAIN BEHAVIOR OF SKIN: Status: ACTIVE | Noted: 2020-12-17

## 2020-12-17 PROCEDURE — ? BIOPSY BY SHAVE METHOD

## 2020-12-17 PROCEDURE — 11103 TANGNTL BX SKIN EA SEP/ADDL: CPT

## 2020-12-17 PROCEDURE — 11102 TANGNTL BX SKIN SINGLE LES: CPT

## 2020-12-17 ASSESSMENT — LOCATION ZONE DERM
LOCATION ZONE: TRUNK
LOCATION ZONE: AXILLAE

## 2020-12-17 ASSESSMENT — LOCATION DETAILED DESCRIPTION DERM
LOCATION DETAILED: PERIUMBILICAL SKIN
LOCATION DETAILED: LEFT AXILLARY VAULT

## 2020-12-17 ASSESSMENT — LOCATION SIMPLE DESCRIPTION DERM
LOCATION SIMPLE: ABDOMEN
LOCATION SIMPLE: LEFT AXILLARY VAULT

## 2020-12-21 ENCOUNTER — TELEMEDICINE (OUTPATIENT)
Dept: MEDICAL GROUP | Facility: PHYSICIAN GROUP | Age: 45
End: 2020-12-21
Payer: COMMERCIAL

## 2020-12-21 VITALS — RESPIRATION RATE: 18 BRPM | HEIGHT: 65 IN | BODY MASS INDEX: 46.32 KG/M2 | WEIGHT: 278 LBS

## 2020-12-21 DIAGNOSIS — G47.33 OSA (OBSTRUCTIVE SLEEP APNEA): ICD-10-CM

## 2020-12-21 DIAGNOSIS — R80.9 TYPE 2 DIABETES MELLITUS WITH MICROALBUMINURIA, WITHOUT LONG-TERM CURRENT USE OF INSULIN (HCC): ICD-10-CM

## 2020-12-21 DIAGNOSIS — E11.29 TYPE 2 DIABETES MELLITUS WITH MICROALBUMINURIA, WITHOUT LONG-TERM CURRENT USE OF INSULIN (HCC): ICD-10-CM

## 2020-12-21 PROCEDURE — 99214 OFFICE O/P EST MOD 30 MIN: CPT | Mod: 95,CR | Performed by: PHYSICIAN ASSISTANT

## 2020-12-21 RX ORDER — LISINOPRIL 2.5 MG/1
2.5 TABLET ORAL DAILY
Qty: 90 TAB | Refills: 2 | Status: SHIPPED | OUTPATIENT
Start: 2020-12-21 | End: 2021-05-21 | Stop reason: SDUPTHER

## 2020-12-21 SDOH — HEALTH STABILITY: MENTAL HEALTH: HOW OFTEN DO YOU HAVE A DRINK CONTAINING ALCOHOL?: MONTHLY OR LESS

## 2020-12-21 SDOH — HEALTH STABILITY: MENTAL HEALTH: HOW OFTEN DO YOU HAVE 6 OR MORE DRINKS ON ONE OCCASION?: LESS THAN MONTHLY

## 2020-12-21 SDOH — HEALTH STABILITY: MENTAL HEALTH: HOW MANY STANDARD DRINKS CONTAINING ALCOHOL DO YOU HAVE ON A TYPICAL DAY?: 3 OR 4

## 2020-12-21 ASSESSMENT — FIBROSIS 4 INDEX: FIB4 SCORE: 0.67

## 2020-12-21 NOTE — PROGRESS NOTES
Virtual Visit: Established Patient   This visit was conducted via Zoom using secure and encrypted videoconferencing technology. The patient was in a private location in the state of [place].    The patient's identity was confirmed and verbal consent was obtained for this virtual visit.    Subjective:   CC:   Chief Complaint   Patient presents with   • Diabetes       Coco Mcdonnell is a 45 y.o. male presenting for evaluation and management of:    Patient is a pleasant 45-year-old male here today follow-up on diabetes.  He tells me that he stopped taking Metformin 500 mg twice daily and lisinopril 2.5 mg 2+ months ago.  States he has been experiencing several life stressors.  He tells me that his grandmother passed away and he also had COVID-19 infection.  Patient would like to restart Metformin and lisinopril.  He denies experiencing side effects or complications from medication.  He feels that his diet is healthy.  Denies having regular exercise routine.  He denies polyuria competency, poor wound healing, vision changes, peripheral neuropathy.  Diabetes was controlled in April 2020.  Unknown if still controlled.  Lab work will be ordered.    Patient has sleep apnea and has been prescribed a CPAP machine.  He tells me that he had not been wearing his CPAP compliantly and now the company is requesting for him to return it.  Patient has been advised to follow-up with his pulmonologist to discuss this in more detail.  Patient read to plan.      ROS   Denies any recent fevers or chills. No nausea or vomiting. No chest pains or shortness of breath.     No Known Allergies    Current medicines (including changes today)  Current Outpatient Medications   Medication Sig Dispense Refill   • lisinopril (PRINIVIL) 2.5 MG Tab Take 1 Tab by mouth every day. 90 Tab 2   • metFORMIN (GLUCOPHAGE) 500 MG Tab Take 1 Tab by mouth 2 times a day, with meals. 180 Tab 2     No current facility-administered medications for this visit.   "      Patient Active Problem List    Diagnosis Date Noted   • Type 2 diabetes mellitus with microalbuminuria, without long-term current use of insulin ([facility]) 06/18/2020   • Polycythemia 04/14/2020   • JASON (obstructive sleep apnea) 04/14/2020   • Chronic respiratory failure with hypoxia ([facility]) 04/14/2020   • Class 3 obesity with alveolar hypoventilation, serious comorbidity, and body mass index (BMI) of 45.0 to 49.9 in adult ([facility]) 03/26/2020       Family History   Problem Relation Age of Onset   • No Known Problems Mother    • Heart Attack Father 52        Passed away at 51 y/o   • Hypertension Father    • Hyperlipidemia Father    • Obesity Father    • No Known Problems Brother    • No Known Problems Son    • No Known Problems Son    • No Known Problems Son        He  has a past medical history of Chickenpox, Diabetes ([facility]), Kidney stone, and Obesity.  He  has a past surgical history that includes appendectomy (1981).       Objective:   Resp 18   Ht 1.651 m (5' 5\")   Wt (!) 126.1 kg (278 lb) Comment: pt reported  BMI 46.26 kg/m²     Physical Exam:  Constitutional: Alert, no distress, well-groomed.  Skin: No rashes in visible areas.  Eye: Round. Conjunctiva clear, lids normal. No icterus.   ENMT: Lips pink without lesions, good dentition, moist mucous membranes. Phonation normal.  Neck: No masses, no thyromegaly. Moves freely without pain.  Respiratory: Unlabored respiratory effort, no cough or audible wheeze  Psych: Alert and oriented x3, normal affect and mood.       Assessment and Plan:   The following treatment plan was discussed:     1. Type 2 diabetes mellitus with microalbuminuria, without long-term current use of insulin ([facility])  Chronic problem.  Unknown is stable.  Patient has been out of medications for 2 months.  Refilled lisinopril 2.5 mg tab once daily Metformin 500 mg twice daily.  Advised patient to work on diet and exercise.  Patient will follow up in 6 weeks for further evaluation and to discuss lab " work results.  Metformin dosage may need to be increased at that time.  Patient declined.      - lisinopril (PRINIVIL) 2.5 MG Tab; Take 1 Tab by mouth every day.  Dispense: 90 Tab; Refill: 2  - metFORMIN (GLUCOPHAGE) 500 MG Tab; Take 1 Tab by mouth 2 times a day, with meals.  Dispense: 180 Tab; Refill: 2  - CBC WITH DIFFERENTIAL; Future  - Comp Metabolic Panel; Future  - Lipid Profile; Future  - VITAMIN D,25 HYDROXY; Future  - HEMOGLOBIN A1C; Future  - MICROALB/CREAT RATIO RAND. UR    2. JASON (obstructive sleep apnea)  Advised Patient to follow-up with pulmonology discussed CPAP more to help.  Patient agreed to plan.  Discussed importance of being compliant with CPAP machine.      Follow-up: Return in about 6 weeks (around 2/1/2021), or if symptoms worsen or fail to improve.

## 2021-01-07 ENCOUNTER — SLEEP CENTER VISIT (OUTPATIENT)
Dept: SLEEP MEDICINE | Facility: MEDICAL CENTER | Age: 46
End: 2021-01-07
Payer: COMMERCIAL

## 2021-01-07 VITALS
RESPIRATION RATE: 16 BRPM | HEART RATE: 72 BPM | BODY MASS INDEX: 43.82 KG/M2 | SYSTOLIC BLOOD PRESSURE: 110 MMHG | WEIGHT: 263 LBS | HEIGHT: 65 IN | DIASTOLIC BLOOD PRESSURE: 80 MMHG | OXYGEN SATURATION: 91 %

## 2021-01-07 DIAGNOSIS — R80.9 TYPE 2 DIABETES MELLITUS WITH MICROALBUMINURIA, WITHOUT LONG-TERM CURRENT USE OF INSULIN (HCC): ICD-10-CM

## 2021-01-07 DIAGNOSIS — E11.29 TYPE 2 DIABETES MELLITUS WITH MICROALBUMINURIA, WITHOUT LONG-TERM CURRENT USE OF INSULIN (HCC): ICD-10-CM

## 2021-01-07 DIAGNOSIS — D75.1 POLYCYTHEMIA: ICD-10-CM

## 2021-01-07 DIAGNOSIS — G47.33 OSA (OBSTRUCTIVE SLEEP APNEA): ICD-10-CM

## 2021-01-07 DIAGNOSIS — Z87.891 FORMER SMOKER: ICD-10-CM

## 2021-01-07 DIAGNOSIS — J96.11 CHRONIC RESPIRATORY FAILURE WITH HYPOXIA (HCC): ICD-10-CM

## 2021-01-07 DIAGNOSIS — E66.2 CLASS 3 OBESITY WITH ALVEOLAR HYPOVENTILATION, SERIOUS COMORBIDITY, AND BODY MASS INDEX (BMI) OF 45.0 TO 49.9 IN ADULT (HCC): ICD-10-CM

## 2021-01-07 DIAGNOSIS — Z23 NEED FOR INFLUENZA VACCINATION: ICD-10-CM

## 2021-01-07 PROCEDURE — 99204 OFFICE O/P NEW MOD 45 MIN: CPT | Performed by: INTERNAL MEDICINE

## 2021-01-07 ASSESSMENT — FIBROSIS 4 INDEX: FIB4 SCORE: 0.67

## 2021-01-07 NOTE — PROGRESS NOTES
CC: Evaluation of previously diagnosed JASON    HPI:  Coco Mcdonnell is a 45 y.o. [employer] employee kindly referred by Dominga Villaseñor P.A.-C.  for evaluation of JASON. Patient has seen pulmonary for polycythemia and a sleep study was ordered at that time. The CBC from 3/28/2020 was reviewed and showed a hematocrit of 64.0 with a hemoglobin of 20.3. The patient was subsequently phlebotomized. The last CBC in epic reveals hemoglobin 19.4 and a hematocrit of 57.7.    His home sleep study performed 4/21/2020 showed an YAN of 85.9 most of which were obstructive apneas. The patient's average saturation was 68%, the zoltan was 47%, and the highest saturation was 88%. The patient spent 81.6% of the recording with saturations less than 90%. Heart rate varied between 31 and 102 with a mean heart rate of 67. The patient experienced 519 total snoring episodes/percentage of snoring 13.5%.    There is a compliance report from August showing a 33.3% compliance for 6 hours and 27 minutes. The average residual estimated apnea-hypopnea index is 4.8 on auto titrating CPAP 5-15 cm water.    The patient was noncompliant and his machine was repossessed. Had some family issues in [place] which made it difficult to use his machine.    When he was using his machine he felt improved with less daytime somnolence and improve sleep quality and his AHI was normalized.    Significant comorbidities and modifying factors include COVID-19 infection documented in August 2020, former smoker, type 2 diabetes, polycythemia, and obesity      Patient Active Problem List    Diagnosis Date Noted   • Former smoker 01/07/2021   • Need for influenza vaccination 01/07/2021   • Type 2 diabetes mellitus with microalbuminuria, without long-term current use of insulin ([facility]) 06/18/2020   • Polycythemia 04/14/2020   • JASON (obstructive sleep apnea) 04/14/2020   • Chronic respiratory failure with hypoxia ([facility]) 04/14/2020   • Class 3 obesity with alveolar hypoventilation,  serious comorbidity, and body mass index (BMI) of 45.0 to 49.9 in adult ([facility]) 03/26/2020       Past Medical History:   Diagnosis Date   • Chickenpox    • Diabetes ([facility])    • Kidney stone    • Obesity         Past Surgical History:   Procedure Laterality Date   • APPENDECTOMY  1981       Family History   Problem Relation Age of Onset   • No Known Problems Mother    • Heart Attack Father 52        Passed away at 53 y/o   • Hypertension Father    • Hyperlipidemia Father    • Obesity Father    • No Known Problems Brother    • No Known Problems Son    • No Known Problems Son    • No Known Problems Son        Social History     Socioeconomic History   • Marital status:      Spouse name: Not on file   • Number of children: Not on file   • Years of education: Not on file   • Highest education level: Not on file   Occupational History   • Not on file   Social Needs   • Financial resource strain: Not on file   • Food insecurity     Worry: Not on file     Inability: Not on file   • Transportation needs     Medical: Not on file     Non-medical: Not on file   Tobacco Use   • Smoking status: Former Smoker     Years: 10.00     Types: Cigarettes   • Smokeless tobacco: Never Used   • Tobacco comment: Smokes once a month when with friends and loved ones.   Substance and Sexual Activity   • Alcohol use: Yes     Frequency: Monthly or less     Drinks per session: 3 or 4     Binge frequency: Less than monthly     Comment: Socially.    • Drug use: Yes     Frequency: 4.0 times per week     Types: Marijuana     Comment: 2 times per week.    • Sexual activity: Yes     Partners: Female     Comment: .    Lifestyle   • Physical activity     Days per week: Not on file     Minutes per session: Not on file   • Stress: Not on file   Relationships   • Social connections     Talks on phone: Not on file     Gets together: Not on file     Attends Orthodox service: Not on file     Active member of club or organization: Not on file      "Attends meetings of clubs or organizations: Not on file     Relationship status: Not on file   • Intimate partner violence     Fear of current or ex partner: Not on file     Emotionally abused: Not on file     Physically abused: Not on file     Forced sexual activity: Not on file   Other Topics Concern   • Not on file   Social History Narrative   • Not on file       Current Outpatient Medications   Medication Sig Dispense Refill   • lisinopril (PRINIVIL) 2.5 MG Tab Take 1 Tab by mouth every day. 90 Tab 2   • metFORMIN (GLUCOPHAGE) 500 MG Tab Take 1 Tab by mouth 2 times a day, with meals. 180 Tab 2     No current facility-administered medications for this visit.     \"CURRENT RX\"    ALLERGIES: Patient has no known allergies.    ROS    Constitutional: Denies fever, chills, sweats,  weight loss, fatigue.  Eyes: Denies vision loss, pain, drainage, double vision, glasses.  Ears/Nose/Mouth/Throat: Denies earache, difficulty hearing, rhinitis/nasal congestion, injury, recurrent sore throat, persistent hoarseness, decayed teeth/toothaches, ringing or buzzing in the ears.  Cardiovascular: Denies chest pain, tightness, palpitations, swelling in legs/feet, fainting, difficulty breathing when lying down but gets better when sitting up.   Respiratory: Denies shortness of breath, cough, sputum, wheezing, painful breathing, coughing up blood.   Sleep: per HPI  Gastrointestinal: Denies  difficulty swallowing, nausea, abdominal pain, diarrhea, constipation, heartburn.  Genitourinary: Denies  blood in urine, discharge, frequent urination.   Musculoskeletal: Denies painful joints, sore muscles, back pain.   Integumentary: Denies rashes, lumps, color changes.   Neurological: Denies frequent headaches,weakness, dizziness. Loss of smell has resolved.    PHYSICAL EXAM  Increased BMI    /80 (BP Location: Left arm, Patient Position: Sitting, BP Cuff Size: Large adult)   Pulse 72   Resp 16   Ht 1.651 m (5' 5\")   Wt 119.3 kg (263 lb) "   SpO2 91%   BMI 43.77 kg/m²   Appearance: Well-nourished, well-developed, no acute distress  Eyes:  PERRLA, EOMI  ENMT: masked  Neck: Supple, trachea midline, no masses  Respiratory effort:  No intercostal retractions or use of accessory muscles  Lung auscultation:  No wheezes rhonchi rubs or rales  Cardiac: No murmurs, rubs, or gallops; regular rhythm, normal rate; no edema  Abdomen:  No tenderness, no organomegaly  Musculoskeletal:  Grossly normal; gait and station normal; digits and nails normal  Skin:  No rashes, petechiae, cyanosis  Neurologic: without focal signs; oriented to person, time, place, and purpose; judgement intact  Psychiatric:  No depression, anxiety, agitation        Medical Decision Making:  The medical record was reviewed in its entirety including the referral notes, records from primary care, consultants notes, hospital records, labs, imaging, microbiology, immunology, and immunizations.  Care gaps identified and reviewed with the patient.    Diagnostic and titration nocturnal polysomnograms, home sleep apnea tests, continuous nocturnal oximetry results, multiple sleep latency tests, and compliance reports reviewed.        1. JASON (obstructive sleep apnea)  - DME CPAP    2. Class 3 obesity with alveolar hypoventilation, serious comorbidity, and body mass index (BMI) of 45.0 to 49.9 in adult ([facility])    3. Chronic respiratory failure with hypoxia ([facility])    4. Polycythemia    5. Type 2 diabetes mellitus with microalbuminuria, without long-term current use of insulin ([facility])    6. Former smoker    7. Need for influenza vaccination    - OBESITY COUNSELING (No Charge): Patient identified as having weight management issue.  Appropriate orders and counseling given.    If your BMI is 25-29.9 you are overweight. If your BMI is 30 or greater you are obese. To lose weight eat less, move more, or both. Any diet that reduces caloric intake can help with weight loss. Extra weight may reduce your lifespan. Avoid  dramatic unsustainable dietary changes that result in the yo-yo effect (down then back up.)  Usually small modifications in diet and exercise are easier to stick with.    PLAN:   We will try reordering his auto titrating CPAP 5 to 15 cm water because it was effective. He was urged to be compliant. It is possible that the DME company will not supply him with equipment without retest. His insurance company may not pay for retest so that would be an unfortunate dilemma    The risks of untreated sleep apnea were discussed with the patient at length. Patients with JASON are at increased risk of cardiovascular disease including coronary artery disease, systemic arterial hypertension, pulmonary arterial hypertension, cardiac arrythmias, and stroke. JASON patients have an increased risk of motor vehicle accidents, type 2 diabetes, chronic kidney disease, and non-alcoholic liver disease. The patient was advised to avoid driving a motor vehicle when drowsy.    Positive airway pressure will favorably impact many of the adverse conditions and effects provoked by JASON.    Have advised the patient to follow up with the appropriate healthcare practitioners for all other medical problems and issues.    Mask wearing, handwashing, and social distancing protocols reviewed and encouraged.      Return in about 10 weeks (around 3/18/2021).       Will need to re-check a cbc and cnox on pap once he is back on therapy.    Total time spent 45 minutes.

## 2021-01-14 ENCOUNTER — HOSPITAL ENCOUNTER (OUTPATIENT)
Dept: LAB | Facility: MEDICAL CENTER | Age: 46
End: 2021-01-14
Attending: PHYSICIAN ASSISTANT
Payer: COMMERCIAL

## 2021-01-14 ENCOUNTER — HOSPITAL ENCOUNTER (OUTPATIENT)
Dept: LAB | Facility: MEDICAL CENTER | Age: 46
End: 2021-01-14
Attending: INTERNAL MEDICINE
Payer: COMMERCIAL

## 2021-01-14 ENCOUNTER — OFFICE VISIT (OUTPATIENT)
Dept: HEALTH INFORMATION MANAGEMENT | Facility: MEDICAL CENTER | Age: 46
End: 2021-01-14
Payer: COMMERCIAL

## 2021-01-14 VITALS
BODY MASS INDEX: 43.13 KG/M2 | HEART RATE: 81 BPM | OXYGEN SATURATION: 92 % | SYSTOLIC BLOOD PRESSURE: 122 MMHG | DIASTOLIC BLOOD PRESSURE: 74 MMHG | WEIGHT: 258.9 LBS | HEIGHT: 65 IN

## 2021-01-14 DIAGNOSIS — R73.03 PREDIABETES: ICD-10-CM

## 2021-01-14 DIAGNOSIS — R80.9 TYPE 2 DIABETES MELLITUS WITH MICROALBUMINURIA, WITHOUT LONG-TERM CURRENT USE OF INSULIN (HCC): ICD-10-CM

## 2021-01-14 DIAGNOSIS — E11.29 TYPE 2 DIABETES MELLITUS WITH MICROALBUMINURIA, WITHOUT LONG-TERM CURRENT USE OF INSULIN (HCC): ICD-10-CM

## 2021-01-14 DIAGNOSIS — J96.11 CHRONIC RESPIRATORY FAILURE WITH HYPOXIA (HCC): ICD-10-CM

## 2021-01-14 DIAGNOSIS — G47.33 OSA (OBSTRUCTIVE SLEEP APNEA): ICD-10-CM

## 2021-01-14 DIAGNOSIS — I10 ESSENTIAL HYPERTENSION: ICD-10-CM

## 2021-01-14 DIAGNOSIS — E78.00 HYPERCHOLESTEROLEMIA: ICD-10-CM

## 2021-01-14 DIAGNOSIS — E66.01 MORBID OBESITY WITH BMI OF 50.0-59.9, ADULT (HCC): ICD-10-CM

## 2021-01-14 DIAGNOSIS — E66.2 CLASS 3 OBESITY WITH ALVEOLAR HYPOVENTILATION, SERIOUS COMORBIDITY, AND BODY MASS INDEX (BMI) OF 45.0 TO 49.9 IN ADULT (HCC): ICD-10-CM

## 2021-01-14 LAB
25(OH)D3 SERPL-MCNC: 17 NG/ML (ref 30–100)
25(OH)D3 SERPL-MCNC: 18 NG/ML (ref 30–100)
ALBUMIN SERPL BCP-MCNC: 4.1 G/DL (ref 3.2–4.9)
ALBUMIN SERPL BCP-MCNC: 4.2 G/DL (ref 3.2–4.9)
ALBUMIN/GLOB SERPL: 1.5 G/DL
ALBUMIN/GLOB SERPL: 1.6 G/DL
ALP SERPL-CCNC: 46 U/L (ref 30–99)
ALP SERPL-CCNC: 46 U/L (ref 30–99)
ALT SERPL-CCNC: 22 U/L (ref 2–50)
ALT SERPL-CCNC: 22 U/L (ref 2–50)
ANION GAP SERPL CALC-SCNC: 10 MMOL/L (ref 7–16)
ANION GAP SERPL CALC-SCNC: 9 MMOL/L (ref 7–16)
AST SERPL-CCNC: 15 U/L (ref 12–45)
AST SERPL-CCNC: 16 U/L (ref 12–45)
BASOPHILS # BLD AUTO: 0.4 % (ref 0–1.8)
BASOPHILS # BLD: 0.03 K/UL (ref 0–0.12)
BILIRUB SERPL-MCNC: 0.5 MG/DL (ref 0.1–1.5)
BILIRUB SERPL-MCNC: 0.5 MG/DL (ref 0.1–1.5)
BUN SERPL-MCNC: 9 MG/DL (ref 8–22)
BUN SERPL-MCNC: 9 MG/DL (ref 8–22)
CALCIUM SERPL-MCNC: 9.3 MG/DL (ref 8.4–10.2)
CALCIUM SERPL-MCNC: 9.3 MG/DL (ref 8.4–10.2)
CHLORIDE SERPL-SCNC: 100 MMOL/L (ref 96–112)
CHLORIDE SERPL-SCNC: 99 MMOL/L (ref 96–112)
CHOLEST SERPL-MCNC: 137 MG/DL (ref 100–199)
CHOLEST SERPL-MCNC: 137 MG/DL (ref 100–199)
CO2 SERPL-SCNC: 27 MMOL/L (ref 20–33)
CO2 SERPL-SCNC: 28 MMOL/L (ref 20–33)
CREAT SERPL-MCNC: 0.79 MG/DL (ref 0.5–1.4)
CREAT SERPL-MCNC: 0.84 MG/DL (ref 0.5–1.4)
CREAT UR-MCNC: 155.27 MG/DL
EOSINOPHIL # BLD AUTO: 0.22 K/UL (ref 0–0.51)
EOSINOPHIL NFR BLD: 2.9 % (ref 0–6.9)
ERYTHROCYTE [DISTWIDTH] IN BLOOD BY AUTOMATED COUNT: 48.6 FL (ref 35.9–50)
FASTING STATUS PATIENT QL REPORTED: NORMAL
FASTING STATUS PATIENT QL REPORTED: NORMAL
GLOBULIN SER CALC-MCNC: 2.6 G/DL (ref 1.9–3.5)
GLOBULIN SER CALC-MCNC: 2.7 G/DL (ref 1.9–3.5)
GLUCOSE SERPL-MCNC: 90 MG/DL (ref 65–99)
GLUCOSE SERPL-MCNC: 92 MG/DL (ref 65–99)
HCT VFR BLD AUTO: 57.1 % (ref 42–52)
HDLC SERPL-MCNC: 34 MG/DL
HDLC SERPL-MCNC: 34 MG/DL
HGB BLD-MCNC: 18.7 G/DL (ref 14–18)
IMM GRANULOCYTES # BLD AUTO: 0.01 K/UL (ref 0–0.11)
IMM GRANULOCYTES NFR BLD AUTO: 0.1 % (ref 0–0.9)
LDLC SERPL CALC-MCNC: 86 MG/DL
LDLC SERPL CALC-MCNC: 86 MG/DL
LYMPHOCYTES # BLD AUTO: 2.08 K/UL (ref 1–4.8)
LYMPHOCYTES NFR BLD: 27.3 % (ref 22–41)
MCH RBC QN AUTO: 31.5 PG (ref 27–33)
MCHC RBC AUTO-ENTMCNC: 32.7 G/DL (ref 33.7–35.3)
MCV RBC AUTO: 96.1 FL (ref 81.4–97.8)
MICROALBUMIN UR-MCNC: 1.5 MG/DL
MICROALBUMIN/CREAT UR: 10 MG/G (ref 0–30)
MONOCYTES # BLD AUTO: 0.66 K/UL (ref 0–0.85)
MONOCYTES NFR BLD AUTO: 8.7 % (ref 0–13.4)
NEUTROPHILS # BLD AUTO: 4.62 K/UL (ref 1.82–7.42)
NEUTROPHILS NFR BLD: 60.6 % (ref 44–72)
NRBC # BLD AUTO: 0 K/UL
NRBC BLD-RTO: 0 /100 WBC
PLATELET # BLD AUTO: 203 K/UL (ref 164–446)
PMV BLD AUTO: 10.8 FL (ref 9–12.9)
POTASSIUM SERPL-SCNC: 4.3 MMOL/L (ref 3.6–5.5)
POTASSIUM SERPL-SCNC: 4.3 MMOL/L (ref 3.6–5.5)
PROT SERPL-MCNC: 6.8 G/DL (ref 6–8.2)
PROT SERPL-MCNC: 6.8 G/DL (ref 6–8.2)
RBC # BLD AUTO: 5.94 M/UL (ref 4.7–6.1)
SODIUM SERPL-SCNC: 136 MMOL/L (ref 135–145)
SODIUM SERPL-SCNC: 137 MMOL/L (ref 135–145)
TRIGL SERPL-MCNC: 85 MG/DL (ref 0–149)
TRIGL SERPL-MCNC: 85 MG/DL (ref 0–149)
WBC # BLD AUTO: 7.6 K/UL (ref 4.8–10.8)

## 2021-01-14 PROCEDURE — 80053 COMPREHEN METABOLIC PANEL: CPT

## 2021-01-14 PROCEDURE — 82306 VITAMIN D 25 HYDROXY: CPT

## 2021-01-14 PROCEDURE — 82043 UR ALBUMIN QUANTITATIVE: CPT

## 2021-01-14 PROCEDURE — 82570 ASSAY OF URINE CREATININE: CPT

## 2021-01-14 PROCEDURE — 80053 COMPREHEN METABOLIC PANEL: CPT | Mod: 91

## 2021-01-14 PROCEDURE — 80061 LIPID PANEL: CPT

## 2021-01-14 PROCEDURE — 83036 HEMOGLOBIN GLYCOSYLATED A1C: CPT

## 2021-01-14 PROCEDURE — 93000 ELECTROCARDIOGRAM COMPLETE: CPT | Performed by: INTERNAL MEDICINE

## 2021-01-14 PROCEDURE — 85025 COMPLETE CBC W/AUTO DIFF WBC: CPT

## 2021-01-14 PROCEDURE — 36415 COLL VENOUS BLD VENIPUNCTURE: CPT

## 2021-01-14 PROCEDURE — 99205 OFFICE O/P NEW HI 60 MIN: CPT | Performed by: INTERNAL MEDICINE

## 2021-01-14 PROCEDURE — 82306 VITAMIN D 25 HYDROXY: CPT | Mod: 91

## 2021-01-14 PROCEDURE — 80061 LIPID PANEL: CPT | Mod: 91

## 2021-01-14 PROCEDURE — 97802 MEDICAL NUTRITION INDIV IN: CPT | Performed by: DIETITIAN, REGISTERED

## 2021-01-14 RX ORDER — SEMAGLUTIDE 1.34 MG/ML
0.25 INJECTION, SOLUTION SUBCUTANEOUS
Qty: 1 EACH | Refills: 3 | Status: SHIPPED | OUTPATIENT
Start: 2021-01-14

## 2021-01-14 ASSESSMENT — FIBROSIS 4 INDEX: FIB4 SCORE: 0.67

## 2021-01-14 ASSESSMENT — PATIENT HEALTH QUESTIONNAIRE - PHQ9: CLINICAL INTERPRETATION OF PHQ2 SCORE: 0

## 2021-01-14 NOTE — PROGRESS NOTES
"Bariatric Medicine H&P  Chief Complaint   Patient presents with   • Weight Gain   • Obesity       Referred by: LORAINE Gilmore    History of Present Illness  Coco Mcdonnell is a 45 y.o.  male who presents for weight management and to help address co-morbidities caused by overweight, as below.    The patient is hoping to improve his overall health with weight loss.  He is especially concerned about diabetes control.  He has not been part of a formal weight loss program in the past.  He has not tracked his intake in the past.    H/o Antiobesity medications: None  H/o Bariatric Surgery: None    Brief Diet History (see also RD notes):  AM: Eggs, bread  Lunch/Dinner: Works nights, eats salad, sushi during that time  Has been reducing fatty and greasy foods  Loves seafood, meat but reducing red meat intake  Snacks: Fruit at times but little snacking  Drinks: Coffee, water    Behavior-Related History  Binge eating screen: Negative  H/o abuse: Negative     Medical Dx:  Sleep apnea screen: Positive on CPAP  T2DM: Control fair with metformin  HTN: Controlled with lisinopril  Chronic respiratory failure: Controlled, not on chronic oxygen  HLD: LDL elevated given T2DM, not on statin or fenofibrate    Exercise:   None  Walking during work hours     Review of Systems   Negative for chronic fatigue, lack of energy, current shortness of breath, back and joint pain, depression or anxiety, GERD, polyuria or polydipsia  All other ROS were reviewed with patient today and are negative.      PMH/PSH:  I have reviewed the patient's medical, social and family history, allergies, and medications today.  Prior records reviewed.  Works nights at [employer]  Originally from [facility], moved 2 years ago from [facility]    Physical Exam:   /74 (BP Location: Left arm, Patient Position: Sitting, BP Cuff Size: Large adult)   Pulse 81   Ht 1.651 m (5' 5\")   Wt 117.4 kg (258 lb 14.4 oz)   SpO2 92%   BMI 43.08 kg/m²   Waist " Measurement   Waist: 48.5 inch/inches  Body fat % & REE:  see accompanying flow sheet    Constitutional: Oriented to person, place, and time and well-developed, well-nourished, and in no distress.    HENT: No facial plethora.  No Cushingoid features.  No scalloped tongue.  No dental erosions.  No swollen parotids.  Head: Normocephalic.   Eyes: EOM are normal. Pupils are equal, round, and reactive to light. No periorbital edema.  No lateral thinning of eyebrows.  No vertical nystagmus.  Neck: Normal range of motion. Neck supple. No thyromegaly present. No buffalo hump.  Cardiovascular: Normal rate and regular rhythm.  No murmur heard.  Pulmonary/Chest: Effort normal and breath sounds normal. No wheezes.   Abdominal: Soft. Bowel sounds are normal.  Grade 1 pannus with firm distended abdomen.  No ascites.  No easily palpable hepatosplenomegaly.   Musculoskeletal: Normal range of motion. No edema.   Neurological: Alert and oriented to person, place, and time. Normal reflexes. No cranial nerve deficit. No muscle weakness.  Gait normal.   Skin: Warm and dry. Not diaphoretic.   No acanthosis nigricans.  Not excessively dry, scaly.  No acne.  No bruising/ecchymosis.  No hyperpigmentation.  No xanthomas or acrochordon.    Psychiatric: Mood, memory, affect and judgment normal.     Laboratory:   Prior labs reviewed.  EKG: Sinus rhythm, rate 70, no ST elevations or depressions.  Corrected QT 0.404  Ordered, performed in our office today, and reviewed by me today.    Dietitian Assessment: I have reviewed the Dietitian's assessment related to this encounter.       ASSESSMENT  45 y.o. male presents for intensive lifestyle intervention for treatment of obesity and co-morbid conditions.  Obesity Stage ([place])/Class 2/3    1. Type 2 diabetes mellitus with microalbuminuria, without long-term current use of insulin (HCC)     2. JASON (obstructive sleep apnea)     3. Chronic respiratory failure with hypoxia (HCC)     4. Class 3 obesity  with alveolar hypoventilation, serious comorbidity, and body mass index (BMI) of 45.0 to 49.9 in adult (HCC)     5. Hypercholesterolemia     6. Essential hypertension         The patient has begun to lose weight with reduction in refined CHO intake, working on timing of meals so recommend continuing same.  Start tracking intake for better accountability and work on good macronutrient balance, diabetes education.  Start GLP-1 agonist for better fasting glucose control and appetite suppression.  Continue to monitor fasting glucose, sleep, lipids and blood pressure.  These will improve with further weight loss.  Respiratory failure seems most related to hypoventilatory syndrome related to weight gain, not currently requiring oxygen.    PLAN  Weight Goal: <200 lb  Dietary intervention:     MR: Try 1 ND daily  High Protein/Low Carb whole food meals and 2 snacks between meals daily  >100 g protein, <100 g total carbs daily, 1800 kcal/d initial goal  Track daily intake with My Fitness Pal, bring to next visit  64+ oz water per day  Avoid skipping meals  Continue to work on timing of meals with night shift work  Physical Activity: Beginning to walk more on days off work  Labs: CMP, lipids, A1c, TSH, vitamin D prior to next visit  Diagnostics:  EKG  Rx changes:   Start Ozempic 0.25 mg weekly, titrate up as needed  Behavior change:   Continue to work on timing of meals and increasing activity level  Surgical considerations: Patient declines referral to bariatric surgery  Follow-up: one month with MD, weekly to biweekly for preventive obesity counseling    Face to face time spent 60 minutes,  with >50% of time devoted to one on one counseling on weight management issues, as documented above.      Patient's body mass index is 43.08 kg/m². Exercise and nutrition counseling were performed at this visit.

## 2021-01-14 NOTE — PROGRESS NOTES
"1/14/2021     Coco Sathya 45 y.o.        Time in/out: 9:39-10:13 AM    Anthropometrics/Objective  Vitals 1/14/2021   WEIGHT 258.9   HEIGHT 5' 5\"   BMI 43.08 kg/m2   Stated Goal Weight: see MD note  See comprehensive patient history form for further information     Subjective:  Pt is here today for the initial screening visit for the medical weight management program.   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  - has stopped eating rice since diabetes diagnosis, eats whole wheat bread now  - drinks [place] iced tea daily, 1-2 (16 oz bottles) of water, and 1 cup of coffee daily  - currently taking metformin and plans to start taking Ozempic    See HIP Medical Questionnaire in media for more detailed diet history     Nutrition Diagnosis (PES Statement)  · Obesity related to excessive energy intake and inadequate energy expenditure as evidenced by BMI >30     Client history:  Condition(s) associated with a diagnosis or treatment / Pertinent Medical Hx: T2DM, JASON, polycythemia, chronic respiratory failure with hypoxia, former smoker, hypercholesterolemia, essential HTN    Biochemical data, medical test and procedures  Lab Results   Component Value Date/Time    HBA1C 6.8 (H) 03/28/2020 10:41 AM     No results found for: POCGLUCOSE  Lab Results   Component Value Date/Time    CHOLSTRLTOT 162 03/28/2020 10:41 AM     (H) 03/28/2020 10:41 AM    HDL 40 03/28/2020 10:41 AM    TRIGLYCERIDE 76 03/28/2020 10:41 AM         Nutrition Intervention  Nutrition Prescription  Recommended Daily Kcals: 0023-3822 Kcal based on REE of 2222 (*1800 kcal based on Dr. Harrington's recommendation)   Recommended Daily Protein: 100g or ~30% of kcals      Meal Plan Recommendation   Calorie controlled, high protein, low carb diet    with 0-1 meal replacements per day  1-2 snacks; 1 oz protein + non-starchy veggies or 1 fruit      Comprehensive " Nutrition Education Instruction or training leading to in-depth nutrition related knowledge about:   Benefits to following meal plan, combine carb, protein and fat at each meal, meal timing and spacing, portion control, sweets and alcohol in moderation.  Handouts provided regarding topics discussed:   - Meal Plan   - My Plate Planner    - Snack list with fruit   - Meal ideas   - MyFitnessPal How-To Guide   - Protein Snack Bar Ideas    Monitoring & Evaluation Plan    Behavioral-Environmental:  Behavior: Keep a food journal and bring to next appointment   Physical activity: Did not discuss today. Pt instructed to avoid subtracting calories from physical activity on My Fitness Pal.     Food / Nutrient Intake:  Food intake: Follow meal plan as discussed. Avoid concentrated sweets and processed carbs. Use the plate method for portion control and macronutrient balance. Limit carbs/starch to up to 1/2 cup per meal. Snacks should be 1oz protein + ns veggies or fruit. Fruit as a snack once per day.     Fluid intake: Consume at least 64 oz water per day. Avoid all sweetened beverages. Limit coffee to 1 cup a day (ideally no cream or sugar). Limit or avoid alcohol as discussed with Dr. Harrington.     Physical Signs / Symptoms:  Weight loss towards BMI < 30   Weight change -1-2 lbs per week    Assessment Notes:  Today I oriented Coco to Dr. Harrington's Medical Weight Management program. Encouraged a higher protein, lower carbohydrate, and calorie controlled meal plan consisting of 3 meals and 1-2 snacks per day with optional use of meal replacements. Encouraged patient to track their food/beverage intake on My Fitness Pal or utilize a written food journal.  We discussed how to build protein into each meal and snack, incorporating 1/2 plate non-starchy vegetable twice daily, optional 1/2 cup of complex carbohydrate at meals if desired, and avoiding refined carbohydrates and simple sugars. Reviewed snack guidelines to  include 1 oz protein and optional non-starchy vegetable or 1 serving of fruit.      Coco will be aiming for 1800 kcal/d.  The pt will also be looking at the macronutrient feature and aiming for 100g or less of carbohydrate and 100g or more of protein per day per Dr. Harrington recommendations (or 30% or less of CHO and 30% or more of protein from calories).     Reviewed meal timing for pt's nighttime work schedule, and pt plans to continue to eat 2 meals and 2 snacks per day at this time. Encouraged pt to have 3 meals per day. Recommended for pt to increase water intake and to avoid sugar sweetened beverages. Pt plans to start tracking his intake on My Fitness Pal. Provided pt with information about the T2DM class offered at [facility]. Next visit suggest to adjust nutrition goals in MFP and review food journal.     F/U: 2 weeks FLORENCE/MD

## 2021-01-15 DIAGNOSIS — E55.9 VITAMIN D DEFICIENCY: ICD-10-CM

## 2021-01-15 LAB
EST. AVERAGE GLUCOSE BLD GHB EST-MCNC: 134 MG/DL
HBA1C MFR BLD: 6.3 % (ref 0–5.6)

## 2021-01-15 RX ORDER — ERGOCALCIFEROL 1.25 MG/1
50000 CAPSULE ORAL
Qty: 30 CAP | Refills: 0 | Status: SHIPPED | OUTPATIENT
Start: 2021-01-15 | End: 2021-05-21 | Stop reason: SDUPTHER

## 2021-01-18 PROBLEM — E55.9 VITAMIN D DEFICIENCY: Status: ACTIVE | Noted: 2021-01-18

## 2021-01-28 ENCOUNTER — APPOINTMENT (OUTPATIENT)
Dept: HEALTH INFORMATION MANAGEMENT | Facility: MEDICAL CENTER | Age: 46
End: 2021-01-28
Payer: COMMERCIAL

## 2021-05-21 DIAGNOSIS — E55.9 VITAMIN D DEFICIENCY: ICD-10-CM

## 2021-05-21 DIAGNOSIS — R80.9 TYPE 2 DIABETES MELLITUS WITH MICROALBUMINURIA, WITHOUT LONG-TERM CURRENT USE OF INSULIN (HCC): ICD-10-CM

## 2021-05-21 DIAGNOSIS — E11.29 TYPE 2 DIABETES MELLITUS WITH MICROALBUMINURIA, WITHOUT LONG-TERM CURRENT USE OF INSULIN (HCC): ICD-10-CM

## 2021-05-21 RX ORDER — LISINOPRIL 2.5 MG/1
2.5 TABLET ORAL DAILY
Qty: 90 TABLET | Refills: 0 | Status: SHIPPED | OUTPATIENT
Start: 2021-05-21 | End: 2021-05-21

## 2021-05-21 RX ORDER — ERGOCALCIFEROL 1.25 MG/1
50000 CAPSULE ORAL
Qty: 30 CAPSULE | Refills: 0 | Status: SHIPPED | OUTPATIENT
Start: 2021-05-21 | End: 2021-05-21

## 2021-05-21 RX ORDER — LISINOPRIL 2.5 MG/1
2.5 TABLET ORAL DAILY
Qty: 90 TABLET | Refills: 0 | Status: SHIPPED | OUTPATIENT
Start: 2021-05-21 | End: 2022-01-14 | Stop reason: SDUPTHER

## 2021-05-21 RX ORDER — ERGOCALCIFEROL 1.25 MG/1
50000 CAPSULE ORAL
Qty: 30 CAPSULE | Refills: 0 | Status: SHIPPED | OUTPATIENT
Start: 2021-05-21 | End: 2022-01-14 | Stop reason: SDUPTHER

## 2021-10-15 DIAGNOSIS — R80.9 TYPE 2 DIABETES MELLITUS WITH MICROALBUMINURIA, WITHOUT LONG-TERM CURRENT USE OF INSULIN (HCC): ICD-10-CM

## 2021-10-15 DIAGNOSIS — E11.29 TYPE 2 DIABETES MELLITUS WITH MICROALBUMINURIA, WITHOUT LONG-TERM CURRENT USE OF INSULIN (HCC): ICD-10-CM

## 2022-01-14 ENCOUNTER — APPOINTMENT (OUTPATIENT)
Dept: PHARMACY | Facility: MEDICAL CENTER | Age: 47
End: 2022-01-14
Payer: COMMERCIAL

## 2022-01-14 ENCOUNTER — PHARMACY VISIT (OUTPATIENT)
Dept: PHARMACY | Facility: MEDICAL CENTER | Age: 47
End: 2022-01-14
Payer: COMMERCIAL

## 2022-01-14 DIAGNOSIS — R80.9 TYPE 2 DIABETES MELLITUS WITH MICROALBUMINURIA, WITHOUT LONG-TERM CURRENT USE OF INSULIN (HCC): ICD-10-CM

## 2022-01-14 DIAGNOSIS — E11.29 TYPE 2 DIABETES MELLITUS WITH MICROALBUMINURIA, WITHOUT LONG-TERM CURRENT USE OF INSULIN (HCC): ICD-10-CM

## 2022-01-14 PROCEDURE — RXMED WILLOW AMBULATORY MEDICATION CHARGE: Performed by: INTERNAL MEDICINE

## 2022-01-14 RX ORDER — LISINOPRIL 2.5 MG/1
2.5 TABLET ORAL DAILY
Qty: 90 TABLET | Refills: 0 | Status: SHIPPED | OUTPATIENT
Start: 2022-01-14

## 2022-01-14 RX ORDER — CX-024414 0.2 MG/ML
0.25 INJECTION, SUSPENSION INTRAMUSCULAR
Qty: 0.25 ML | Refills: 0 | OUTPATIENT
Start: 2022-01-14